# Patient Record
Sex: FEMALE | Race: WHITE | NOT HISPANIC OR LATINO | Employment: FULL TIME | URBAN - METROPOLITAN AREA
[De-identification: names, ages, dates, MRNs, and addresses within clinical notes are randomized per-mention and may not be internally consistent; named-entity substitution may affect disease eponyms.]

---

## 2017-11-05 ENCOUNTER — HOSPITAL ENCOUNTER (OUTPATIENT)
Dept: URGENT CARE | Facility: CLINIC | Age: 52
Discharge: HOME | End: 2017-11-05
Attending: FAMILY MEDICINE | Admitting: FAMILY MEDICINE

## 2017-11-09 ENCOUNTER — HOSPITAL ENCOUNTER (OUTPATIENT)
Dept: ORTHOPEDIC SURGERY | Facility: CLINIC | Age: 52
Discharge: HOME | End: 2017-11-09
Attending: PHYSICIAN ASSISTANT | Admitting: PHYSICIAN ASSISTANT

## 2017-11-28 ENCOUNTER — HOSPITAL ENCOUNTER (OUTPATIENT)
Dept: GENERAL RADIOLOGY | Facility: HOSPITAL | Age: 52
Discharge: HOME | End: 2017-11-28
Attending: PHYSICIAN ASSISTANT | Admitting: PHYSICIAN ASSISTANT

## 2018-04-12 ENCOUNTER — ON CAMPUS - OUTPATIENT (AMBULATORY)
Dept: URBAN - METROPOLITAN AREA HOSPITAL 2 | Facility: HOSPITAL | Age: 53
End: 2018-04-12
Payer: COMMERCIAL

## 2018-04-12 VITALS
SYSTOLIC BLOOD PRESSURE: 103 MMHG | HEART RATE: 64 BPM | RESPIRATION RATE: 18 BRPM | OXYGEN SATURATION: 100 % | DIASTOLIC BLOOD PRESSURE: 70 MMHG

## 2018-04-12 VITALS
SYSTOLIC BLOOD PRESSURE: 142 MMHG | OXYGEN SATURATION: 99 % | RESPIRATION RATE: 19 BRPM | DIASTOLIC BLOOD PRESSURE: 99 MMHG | HEART RATE: 64 BPM

## 2018-04-12 VITALS
OXYGEN SATURATION: 95 % | RESPIRATION RATE: 16 BRPM | SYSTOLIC BLOOD PRESSURE: 101 MMHG | DIASTOLIC BLOOD PRESSURE: 64 MMHG | HEART RATE: 73 BPM

## 2018-04-12 VITALS
OXYGEN SATURATION: 100 % | HEART RATE: 67 BPM | DIASTOLIC BLOOD PRESSURE: 77 MMHG | SYSTOLIC BLOOD PRESSURE: 132 MMHG | RESPIRATION RATE: 18 BRPM

## 2018-04-12 VITALS
SYSTOLIC BLOOD PRESSURE: 106 MMHG | HEART RATE: 68 BPM | DIASTOLIC BLOOD PRESSURE: 68 MMHG | RESPIRATION RATE: 16 BRPM | OXYGEN SATURATION: 97 %

## 2018-04-12 VITALS
OXYGEN SATURATION: 99 % | HEART RATE: 60 BPM | DIASTOLIC BLOOD PRESSURE: 60 MMHG | SYSTOLIC BLOOD PRESSURE: 99 MMHG | RESPIRATION RATE: 18 BRPM

## 2018-04-12 VITALS
SYSTOLIC BLOOD PRESSURE: 107 MMHG | OXYGEN SATURATION: 98 % | HEART RATE: 70 BPM | RESPIRATION RATE: 18 BRPM | DIASTOLIC BLOOD PRESSURE: 66 MMHG

## 2018-04-12 DIAGNOSIS — K64.8 OTHER HEMORRHOIDS: ICD-10-CM

## 2018-04-12 DIAGNOSIS — Z86.010 PERSONAL HISTORY OF COLONIC POLYPS: ICD-10-CM

## 2018-04-12 DIAGNOSIS — K57.30 DIVERTICULOSIS OF LARGE INTESTINE WITHOUT PERFORATION OR ABS: ICD-10-CM

## 2018-04-12 PROBLEM — Z12.11 SCREENING FOR MALIGNANT NEOPLASMS OF COLON: Status: ACTIVE | Noted: 2018-04-12

## 2018-04-12 PROCEDURE — 45378 DIAGNOSTIC COLONOSCOPY: CPT | Mod: 33 | Performed by: INTERNAL MEDICINE

## 2018-04-12 RX ADMIN — PROPOFOL: 10 INJECTION, EMULSION INTRAVENOUS at 09:42

## 2018-11-19 ENCOUNTER — APPOINTMENT (OUTPATIENT)
Dept: URBAN - METROPOLITAN AREA CLINIC 282 | Age: 53
Setting detail: DERMATOLOGY
End: 2018-11-26

## 2018-11-19 DIAGNOSIS — B36.0 PITYRIASIS VERSICOLOR: ICD-10-CM

## 2018-11-19 DIAGNOSIS — L82.0 INFLAMED SEBORRHEIC KERATOSIS: ICD-10-CM

## 2018-11-19 DIAGNOSIS — L82.1 OTHER SEBORRHEIC KERATOSIS: ICD-10-CM

## 2018-11-19 DIAGNOSIS — D22 MELANOCYTIC NEVI: ICD-10-CM

## 2018-11-19 DIAGNOSIS — Z12.83 ENCOUNTER FOR SCREENING FOR MALIGNANT NEOPLASM OF SKIN: ICD-10-CM

## 2018-11-19 DIAGNOSIS — D18.0 HEMANGIOMA: ICD-10-CM

## 2018-11-19 DIAGNOSIS — Z71.89 OTHER SPECIFIED COUNSELING: ICD-10-CM

## 2018-11-19 DIAGNOSIS — L21.8 OTHER SEBORRHEIC DERMATITIS: ICD-10-CM

## 2018-11-19 PROBLEM — D23.71 OTHER BENIGN NEOPLASM OF SKIN OF RIGHT LOWER LIMB, INCLUDING HIP: Status: ACTIVE | Noted: 2018-11-19

## 2018-11-19 PROBLEM — D22.71 MELANOCYTIC NEVI OF RIGHT LOWER LIMB, INCLUDING HIP: Status: ACTIVE | Noted: 2018-11-19

## 2018-11-19 PROBLEM — D18.01 HEMANGIOMA OF SKIN AND SUBCUTANEOUS TISSUE: Status: ACTIVE | Noted: 2018-11-19

## 2018-11-19 PROBLEM — D48.5 NEOPLASM OF UNCERTAIN BEHAVIOR OF SKIN: Status: ACTIVE | Noted: 2018-11-19

## 2018-11-19 PROCEDURE — OTHER REASSURANCE: OTHER

## 2018-11-19 PROCEDURE — OTHER OBSERVATION: OTHER

## 2018-11-19 PROCEDURE — OTHER COUNSELING: OTHER

## 2018-11-19 PROCEDURE — OTHER PRESCRIPTION: OTHER

## 2018-11-19 PROCEDURE — 17110 DESTRUCT B9 LESION 1-14: CPT

## 2018-11-19 PROCEDURE — 11100: CPT | Mod: 59

## 2018-11-19 PROCEDURE — 99203 OFFICE O/P NEW LOW 30 MIN: CPT | Mod: 25

## 2018-11-19 PROCEDURE — OTHER OBSERVATION AND MEASURE: OTHER

## 2018-11-19 PROCEDURE — OTHER BIOPSY BY SHAVE METHOD: OTHER

## 2018-11-19 PROCEDURE — OTHER LIQUID NITROGEN: OTHER

## 2018-11-19 RX ORDER — KETOCONAZOLE 20.5 MG/ML
SHAMPOO, SUSPENSION TOPICAL
Qty: 1 | Refills: 2 | Status: ERX

## 2018-11-19 ASSESSMENT — LOCATION DETAILED DESCRIPTION DERM
LOCATION DETAILED: LEFT INFERIOR UPPER BACK
LOCATION DETAILED: RIGHT SUPERIOR LATERAL MIDBACK
LOCATION DETAILED: RIGHT MEDIAL UPPER BACK
LOCATION DETAILED: LEFT MID-UPPER BACK
LOCATION DETAILED: NASAL SUPRATIP
LOCATION DETAILED: RIGHT SUPERIOR PARIETAL SCALP
LOCATION DETAILED: RIGHT SUPERIOR UPPER BACK
LOCATION DETAILED: EPIGASTRIC SKIN
LOCATION DETAILED: RIGHT ANTERIOR PROXIMAL THIGH
LOCATION DETAILED: INFERIOR THORACIC SPINE
LOCATION DETAILED: SUPERIOR THORACIC SPINE

## 2018-11-19 ASSESSMENT — LOCATION ZONE DERM
LOCATION ZONE: NOSE
LOCATION ZONE: LEG
LOCATION ZONE: SCALP
LOCATION ZONE: TRUNK

## 2018-11-19 ASSESSMENT — LOCATION SIMPLE DESCRIPTION DERM
LOCATION SIMPLE: LEFT UPPER BACK
LOCATION SIMPLE: NOSE
LOCATION SIMPLE: ABDOMEN
LOCATION SIMPLE: RIGHT LOWER BACK
LOCATION SIMPLE: SCALP
LOCATION SIMPLE: UPPER BACK
LOCATION SIMPLE: RIGHT THIGH
LOCATION SIMPLE: RIGHT UPPER BACK

## 2018-11-19 NOTE — PROCEDURE: LIQUID NITROGEN
Render Post-Care Instructions In Note?: no
Post-Care Instructions: I reviewed with the patient in detail post-care instructions. Patient is to wear sunprotection, and avoid picking at any of the treated lesions. Pt may apply Vaseline to crusted or scabbing areas.
Medical Necessity Clause: This procedure was medically necessary because the lesions that were treated were: getting pressed under pt's glasses
Detail Level: Detailed
Number Of Freeze-Thaw Cycles: 2 freeze-thaw cycles
Duration Of Freeze Thaw-Cycle (Seconds): 10-15
Medical Necessity Information: It is in your best interest to select a reason for this procedure from the list below. All of these items fulfill various CMS LCD requirements except the new and changing color options.
Consent: The patient's consent was obtained including but not limited to risks of crusting, scabbing, blistering, scarring, darker or lighter pigmentary change, recurrence, incomplete removal and infection.

## 2018-11-19 NOTE — PROCEDURE: BIOPSY BY SHAVE METHOD
Bill 32330 For Specimen Handling/Conveyance To Laboratory?: no
Biopsy Type: H and E
Notification Instructions: Patient will be notified of biopsy results. However, patient instructed to call the office if not contacted within 2 weeks.
Post-Care Instructions: I reviewed with the patient in detail post-care instructions. Patient is to keep the biopsy site dry overnight, and then apply bacitracin twice daily until healed. Patient may apply hydrogen peroxide soaks to remove any crusting.
Cryotherapy Text: The wound bed was treated with cryotherapy after the biopsy was performed.
Anesthesia Volume In Cc (Will Not Render If 0): 0.2
Hemostasis: Drysol
Silver Nitrate Text: The wound bed was treated with silver nitrate after the biopsy was performed.
Curettage Text: The wound bed was treated with curettage after the biopsy was performed.
Electrodesiccation Text: The wound bed was treated with electrodesiccation after the biopsy was performed
Was A Bandage Applied: Yes
X Size Of Lesion In Cm: 0
Dressing: bandage
Detail Level: Detailed
Wound Care: Vaseline
Electrodesiccation And Curettage Text: The wound bed was treated with electrodesiccation and curettage after the biopsy was performed.
Size Of Lesion In Cm: 0.5
Depth Of Biopsy: dermis
Type Of Destruction Used: Curettage
Billing Type: Third-Party Bill
Biopsy Method: Acu-Razor
Consent: Written consent was obtained and risks were reviewed including but not limited to scarring, infection, bleeding, scabbing, incomplete removal, nerve damage and allergy to anesthesia.
Anesthesia Type: 1% lidocaine with 1:100,000 epinephrine

## 2020-07-13 ENCOUNTER — APPOINTMENT (OUTPATIENT)
Dept: URBAN - METROPOLITAN AREA CLINIC 282 | Age: 55
Setting detail: DERMATOLOGY
End: 2020-07-14

## 2020-07-13 DIAGNOSIS — L21.8 OTHER SEBORRHEIC DERMATITIS: ICD-10-CM

## 2020-07-13 DIAGNOSIS — Z12.83 ENCOUNTER FOR SCREENING FOR MALIGNANT NEOPLASM OF SKIN: ICD-10-CM

## 2020-07-13 DIAGNOSIS — L82.0 INFLAMED SEBORRHEIC KERATOSIS: ICD-10-CM

## 2020-07-13 DIAGNOSIS — Z71.89 OTHER SPECIFIED COUNSELING: ICD-10-CM

## 2020-07-13 DIAGNOSIS — D22 MELANOCYTIC NEVI: ICD-10-CM

## 2020-07-13 PROBLEM — D23.71 OTHER BENIGN NEOPLASM OF SKIN OF RIGHT LOWER LIMB, INCLUDING HIP: Status: ACTIVE | Noted: 2020-07-13

## 2020-07-13 PROBLEM — D22.5 MELANOCYTIC NEVI OF TRUNK: Status: ACTIVE | Noted: 2020-07-13

## 2020-07-13 PROCEDURE — 17110 DESTRUCT B9 LESION 1-14: CPT

## 2020-07-13 PROCEDURE — OTHER COUNSELING: OTHER

## 2020-07-13 PROCEDURE — OTHER OBSERVATION AND MEASURE: OTHER

## 2020-07-13 PROCEDURE — OTHER OBSERVATION: OTHER

## 2020-07-13 PROCEDURE — OTHER LIQUID NITROGEN: OTHER

## 2020-07-13 PROCEDURE — OTHER REASSURANCE: OTHER

## 2020-07-13 PROCEDURE — 99214 OFFICE O/P EST MOD 30 MIN: CPT | Mod: 25

## 2020-07-13 PROCEDURE — OTHER PRESCRIPTION: OTHER

## 2020-07-13 RX ORDER — FLUOCINONIDE 0.5 MG/ML
SOLUTION TOPICAL
Qty: 1 | Refills: 2 | Status: ERX

## 2020-07-13 ASSESSMENT — LOCATION DETAILED DESCRIPTION DERM
LOCATION DETAILED: SUPERIOR THORACIC SPINE
LOCATION DETAILED: LEFT SUPERIOR LATERAL MIDBACK
LOCATION DETAILED: RIGHT SUPERIOR UPPER BACK
LOCATION DETAILED: LEFT INFERIOR LATERAL MALAR CHEEK
LOCATION DETAILED: NASAL DORSUM

## 2020-07-13 ASSESSMENT — LOCATION SIMPLE DESCRIPTION DERM
LOCATION SIMPLE: RIGHT UPPER BACK
LOCATION SIMPLE: UPPER BACK
LOCATION SIMPLE: LEFT CHEEK
LOCATION SIMPLE: LEFT LOWER BACK
LOCATION SIMPLE: NOSE

## 2020-07-13 ASSESSMENT — LOCATION ZONE DERM
LOCATION ZONE: FACE
LOCATION ZONE: TRUNK
LOCATION ZONE: NOSE

## 2020-07-13 NOTE — PROCEDURE: LIQUID NITROGEN
Duration Of Freeze Thaw-Cycle (Seconds): 10-15
Add 52 Modifier (Optional): no
Detail Level: Detailed
Medical Necessity Information: It is in your best interest to select a reason for this procedure from the list below. All of these items fulfill various CMS LCD requirements except the new and changing color options.
Post-Care Instructions: I reviewed with the patient in detail post-care instructions. Patient is to wear sunprotection, and avoid picking at any of the treated lesions. Pt may apply Vaseline to crusted or scabbing areas.
Medical Necessity Clause: This procedure was medically necessary because the lesions that were treated were:
Consent: The patient's consent was obtained including but not limited to risks of crusting, scabbing, blistering, scarring, darker or lighter pigmentary change, recurrence, incomplete removal and infection.
Number Of Freeze-Thaw Cycles: 2 freeze-thaw cycles

## 2021-10-11 ENCOUNTER — APPOINTMENT (OUTPATIENT)
Dept: URBAN - METROPOLITAN AREA CLINIC 282 | Age: 56
Setting detail: DERMATOLOGY
End: 2021-10-15

## 2021-10-11 DIAGNOSIS — L57.8 OTHER SKIN CHANGES DUE TO CHRONIC EXPOSURE TO NONIONIZING RADIATION: ICD-10-CM

## 2021-10-11 DIAGNOSIS — Z71.89 OTHER SPECIFIED COUNSELING: ICD-10-CM

## 2021-10-11 DIAGNOSIS — L82.1 OTHER SEBORRHEIC KERATOSIS: ICD-10-CM

## 2021-10-11 DIAGNOSIS — D22 MELANOCYTIC NEVI: ICD-10-CM

## 2021-10-11 DIAGNOSIS — L72.0 EPIDERMAL CYST: ICD-10-CM

## 2021-10-11 DIAGNOSIS — D18.0 HEMANGIOMA: ICD-10-CM

## 2021-10-11 DIAGNOSIS — L21.8 OTHER SEBORRHEIC DERMATITIS: ICD-10-CM

## 2021-10-11 DIAGNOSIS — L70.0 ACNE VULGARIS: ICD-10-CM

## 2021-10-11 PROBLEM — D22.5 MELANOCYTIC NEVI OF TRUNK: Status: ACTIVE | Noted: 2021-10-11

## 2021-10-11 PROBLEM — D23.71 OTHER BENIGN NEOPLASM OF SKIN OF RIGHT LOWER LIMB, INCLUDING HIP: Status: ACTIVE | Noted: 2021-10-11

## 2021-10-11 PROBLEM — D48.5 NEOPLASM OF UNCERTAIN BEHAVIOR OF SKIN: Status: ACTIVE | Noted: 2021-10-11

## 2021-10-11 PROBLEM — D18.01 HEMANGIOMA OF SKIN AND SUBCUTANEOUS TISSUE: Status: ACTIVE | Noted: 2021-10-11

## 2021-10-11 PROCEDURE — OTHER OBSERVATION: OTHER

## 2021-10-11 PROCEDURE — OTHER COUNSELING: OTHER

## 2021-10-11 PROCEDURE — 99214 OFFICE O/P EST MOD 30 MIN: CPT | Mod: 25

## 2021-10-11 PROCEDURE — OTHER PRESCRIPTION: OTHER

## 2021-10-11 PROCEDURE — OTHER SUNSCREEN RECOMMENDATIONS: OTHER

## 2021-10-11 PROCEDURE — OTHER PRESCRIPTION MEDICATION MANAGEMENT: OTHER

## 2021-10-11 PROCEDURE — OTHER MIPS QUALITY: OTHER

## 2021-10-11 PROCEDURE — OTHER BIOPSY BY SHAVE METHOD: OTHER

## 2021-10-11 PROCEDURE — OTHER OBSERVATION AND MEASURE: OTHER

## 2021-10-11 PROCEDURE — OTHER REASSURANCE: OTHER

## 2021-10-11 PROCEDURE — 11102 TANGNTL BX SKIN SINGLE LES: CPT

## 2021-10-11 RX ORDER — TRETIONIN 0.25 MG/G
CREAM TOPICAL
Qty: 45 | Refills: 1 | Status: ERX | COMMUNITY
Start: 2021-10-11

## 2021-10-11 RX ORDER — FLUOCINONIDE 0.5 MG/ML
SOLUTION TOPICAL
Qty: 60 | Refills: 1 | Status: ERX | COMMUNITY
Start: 2021-10-11

## 2021-10-11 RX ORDER — KETOCONAZOLE 20 MG/ML
SHAMPOO, SUSPENSION TOPICAL
Qty: 120 | Refills: 2 | Status: ERX | COMMUNITY
Start: 2021-10-11

## 2021-10-11 ASSESSMENT — LOCATION SIMPLE DESCRIPTION DERM
LOCATION SIMPLE: LEFT CHEEK
LOCATION SIMPLE: RIGHT CALF
LOCATION SIMPLE: RIGHT UPPER BACK
LOCATION SIMPLE: LEFT CHEEK
LOCATION SIMPLE: LEFT HAND
LOCATION SIMPLE: RIGHT THIGH
LOCATION SIMPLE: RIGHT FOREARM
LOCATION SIMPLE: RIGHT PRETIBIAL REGION
LOCATION SIMPLE: ABDOMEN
LOCATION SIMPLE: CHEST
LOCATION SIMPLE: LEFT UPPER BACK
LOCATION SIMPLE: RIGHT POSTERIOR THIGH
LOCATION SIMPLE: RIGHT SCALP
LOCATION SIMPLE: RIGHT POSTERIOR UPPER ARM
LOCATION SIMPLE: SCALP
LOCATION SIMPLE: RIGHT CHEEK
LOCATION SIMPLE: RIGHT HAND
LOCATION SIMPLE: POSTERIOR NECK
LOCATION SIMPLE: LEFT PRETIBIAL REGION
LOCATION SIMPLE: RIGHT CHEEK
LOCATION SIMPLE: LEFT CALF
LOCATION SIMPLE: LEFT FOREARM

## 2021-10-11 ASSESSMENT — LOCATION DETAILED DESCRIPTION DERM
LOCATION DETAILED: RIGHT ANTERIOR DISTAL THIGH
LOCATION DETAILED: RIGHT RADIAL DORSAL HAND
LOCATION DETAILED: RIGHT DISTAL CALF
LOCATION DETAILED: RIGHT DISTAL POSTERIOR UPPER ARM
LOCATION DETAILED: RIGHT SUPERIOR UPPER BACK
LOCATION DETAILED: LEFT MID-UPPER BACK
LOCATION DETAILED: RIGHT PROXIMAL PRETIBIAL REGION
LOCATION DETAILED: RIGHT DISTAL PRETIBIAL REGION
LOCATION DETAILED: RIGHT PROXIMAL DORSAL FOREARM
LOCATION DETAILED: RIGHT SUPERIOR PARIETAL SCALP
LOCATION DETAILED: LEFT CENTRAL BUCCAL CHEEK
LOCATION DETAILED: RIGHT ANTERIOR PROXIMAL THIGH
LOCATION DETAILED: UPPER STERNUM
LOCATION DETAILED: RIGHT MEDIAL FRONTAL SCALP
LOCATION DETAILED: LEFT CENTRAL MALAR CHEEK
LOCATION DETAILED: RIGHT SUPERIOR MEDIAL UPPER BACK
LOCATION DETAILED: LEFT ULNAR DORSAL HAND
LOCATION DETAILED: RIGHT INFERIOR CENTRAL MALAR CHEEK
LOCATION DETAILED: RIGHT DISTAL POSTERIOR THIGH
LOCATION DETAILED: LEFT PROXIMAL PRETIBIAL REGION
LOCATION DETAILED: EPIGASTRIC SKIN
LOCATION DETAILED: LEFT PROXIMAL DORSAL FOREARM
LOCATION DETAILED: LEFT DISTAL CALF
LOCATION DETAILED: RIGHT CENTRAL MALAR CHEEK
LOCATION DETAILED: MID POSTERIOR NECK
LOCATION DETAILED: LEFT INFERIOR CENTRAL MALAR CHEEK
LOCATION DETAILED: RIGHT LATERAL ABDOMEN

## 2021-10-11 ASSESSMENT — LOCATION ZONE DERM
LOCATION ZONE: FACE
LOCATION ZONE: ARM
LOCATION ZONE: LEG
LOCATION ZONE: HAND
LOCATION ZONE: TRUNK
LOCATION ZONE: SCALP
LOCATION ZONE: NECK
LOCATION ZONE: FACE

## 2021-10-11 NOTE — PROCEDURE: MIPS QUALITY
Quality 130: Documentation Of Current Medications In The Medical Record: Current Medications Documented
Quality 431: Preventive Care And Screening: Unhealthy Alcohol Use - Screening: Patient not identified as an unhealthy alcohol user when screened for unhealthy alcohol use using a systematic screening method
Quality 111:Pneumonia Vaccination Status For Older Adults: Pneumococcal Vaccination Previously Received
Detail Level: Detailed
Quality 110: Preventive Care And Screening: Influenza Immunization: Influenza Immunization previously received during influenza season

## 2021-10-11 NOTE — PROCEDURE: COUNSELING
Minocycline Counseling: Patient advised regarding possible photosensitivity and discoloration of the teeth, skin, lips, tongue and gums.  Patient instructed to avoid sunlight, if possible.  When exposed to sunlight, patients should wear protective clothing, sunglasses, and sunscreen.  The patient was instructed to call the office immediately if the following severe adverse effects occur:  hearing changes, easy bruising/bleeding, severe headache, or vision changes.  The patient verbalized understanding of the proper use and possible adverse effects of minocycline.  All of the patient's questions and concerns were addressed.
High Dose Vitamin A Counseling: Side effects reviewed, pt to contact office should one occur.
Azithromycin Counseling:  I discussed with the patient the risks of azithromycin including but not limited to GI upset, allergic reaction, drug rash, diarrhea, and yeast infections.
Detail Level: Detailed
Topical Sulfur Applications Counseling: Topical Sulfur Counseling: Patient counseled that this medication may cause skin irritation or allergic reactions.  In the event of skin irritation, the patient was advised to reduce the amount of the drug applied or use it less frequently.   The patient verbalized understanding of the proper use and possible adverse effects of topical sulfur application.  All of the patient's questions and concerns were addressed.
Erythromycin Counseling:  I discussed with the patient the risks of erythromycin including but not limited to GI upset, allergic reaction, drug rash, diarrhea, increase in liver enzymes, and yeast infections.
Tetracycline Counseling: Patient counseled regarding possible photosensitivity and increased risk for sunburn.  Patient instructed to avoid sunlight, if possible.  When exposed to sunlight, patients should wear protective clothing, sunglasses, and sunscreen.  The patient was instructed to call the office immediately if the following severe adverse effects occur:  hearing changes, easy bruising/bleeding, severe headache, or vision changes.  The patient verbalized understanding of the proper use and possible adverse effects of tetracycline.  All of the patient's questions and concerns were addressed. Patient understands to avoid pregnancy while on therapy due to potential birth defects.
Dapsone Counseling: I discussed with the patient the risks of dapsone including but not limited to hemolytic anemia, agranulocytosis, rashes, methemoglobinemia, kidney failure, peripheral neuropathy, headaches, GI upset, and liver toxicity.  Patients who start dapsone require monitoring including baseline LFTs and weekly CBCs for the first month, then every month thereafter.  The patient verbalized understanding of the proper use and possible adverse effects of dapsone.  All of the patient's questions and concerns were addressed.
Tazorac Pregnancy And Lactation Text: This medication is not safe during pregnancy. It is unknown if this medication is excreted in breast milk.
Birth Control Pills Pregnancy And Lactation Text: This medication should be avoided if pregnant and for the first 30 days post-partum.
Isotretinoin Pregnancy And Lactation Text: This medication is Pregnancy Category X and is considered extremely dangerous during pregnancy. It is unknown if it is excreted in breast milk.
Topical Sulfur Applications Pregnancy And Lactation Text: This medication is Pregnancy Category C and has an unknown safety profile during pregnancy. It is unknown if this topical medication is excreted in breast milk.
Erythromycin Pregnancy And Lactation Text: This medication is Pregnancy Category B and is considered safe during pregnancy. It is also excreted in breast milk.
Topical Clindamycin Pregnancy And Lactation Text: This medication is Pregnancy Category B and is considered safe during pregnancy. It is unknown if it is excreted in breast milk.
Include Pregnancy/Lactation Warning?: No
Spironolactone Counseling: Patient advised regarding risks of diarrhea, abdominal pain, hyperkalemia, birth defects (for female patients), liver toxicity and renal toxicity. The patient may need blood work to monitor liver and kidney function and potassium levels while on therapy. The patient verbalized understanding of the proper use and possible adverse effects of spironolactone.  All of the patient's questions and concerns were addressed.
Azithromycin Pregnancy And Lactation Text: This medication is considered safe during pregnancy and is also secreted in breast milk.
Sarecycline Counseling: Patient advised regarding possible photosensitivity and discoloration of the teeth, skin, lips, tongue and gums.  Patient instructed to avoid sunlight, if possible.  When exposed to sunlight, patients should wear protective clothing, sunglasses, and sunscreen.  The patient was instructed to call the office immediately if the following severe adverse effects occur:  hearing changes, easy bruising/bleeding, severe headache, or vision changes.  The patient verbalized understanding of the proper use and possible adverse effects of sarecycline.  All of the patient's questions and concerns were addressed.
Detail Level: Zone
Topical Retinoid counseling:  Patient advised to apply a pea-sized amount only at bedtime and wait 30 minutes after washing their face before applying.  If too drying, patient may add a non-comedogenic moisturizer. The patient verbalized understanding of the proper use and possible adverse effects of retinoids.  All of the patient's questions and concerns were addressed.
Doxycycline Pregnancy And Lactation Text: This medication is Pregnancy Category D and not consider safe during pregnancy. It is also excreted in breast milk but is considered safe for shorter treatment courses.
High Dose Vitamin A Pregnancy And Lactation Text: High dose vitamin A therapy is contraindicated during pregnancy and breast feeding.
Benzoyl Peroxide Counseling: Patient counseled that medicine may cause skin irritation and bleach clothing.  In the event of skin irritation, the patient was advised to reduce the amount of the drug applied or use it less frequently.   The patient verbalized understanding of the proper use and possible adverse effects of benzoyl peroxide.  All of the patient's questions and concerns were addressed.
Benzoyl Peroxide Pregnancy And Lactation Text: This medication is Pregnancy Category C. It is unknown if benzoyl peroxide is excreted in breast milk.
Detail Level: Simple
Spironolactone Pregnancy And Lactation Text: This medication can cause feminization of the male fetus and should be avoided during pregnancy. The active metabolite is also found in breast milk.
Tazorac Counseling:  Patient advised that medication is irritating and drying.  Patient may need to apply sparingly and wash off after an hour before eventually leaving it on overnight.  The patient verbalized understanding of the proper use and possible adverse effects of tazorac.  All of the patient's questions and concerns were addressed.
Bactrim Counseling:  I discussed with the patient the risks of sulfa antibiotics including but not limited to GI upset, allergic reaction, drug rash, diarrhea, dizziness, photosensitivity, and yeast infections.  Rarely, more serious reactions can occur including but not limited to aplastic anemia, agranulocytosis, methemoglobinemia, blood dyscrasias, liver or kidney failure, lung infiltrates or desquamative/blistering drug rashes.
Bactrim Pregnancy And Lactation Text: This medication is Pregnancy Category D and is known to cause fetal risk.  It is also excreted in breast milk.
Minocycline Pregnancy And Lactation Text: This medication is Pregnancy Category D and not consider safe during pregnancy. It is also excreted in breast milk.
Birth Control Pills Counseling: Birth Control Pill Counseling: I discussed with the patient the potential side effects of OCPs including but not limited to increased risk of stroke, heart attack, thrombophlebitis, deep venous thrombosis, hepatic adenomas, breast changes, GI upset, headaches, and depression.  The patient verbalized understanding of the proper use and possible adverse effects of OCPs. All of the patient's questions and concerns were addressed.
Dapsone Pregnancy And Lactation Text: This medication is Pregnancy Category C and is not considered safe during pregnancy or breast feeding.
Topical Clindamycin Counseling: Patient counseled that this medication may cause skin irritation or allergic reactions.  In the event of skin irritation, the patient was advised to reduce the amount of the drug applied or use it less frequently.   The patient verbalized understanding of the proper use and possible adverse effects of clindamycin.  All of the patient's questions and concerns were addressed.
Isotretinoin Counseling: Patient should get monthly blood tests, not donate blood, not drive at night if vision affected, not share medication, and not undergo elective surgery for 6 months after tx completed. Side effects reviewed, pt to contact office should one occur.
Doxycycline Counseling:  Patient counseled regarding possible photosensitivity and increased risk for sunburn.  Patient instructed to avoid sunlight, if possible.  When exposed to sunlight, patients should wear protective clothing, sunglasses, and sunscreen.  The patient was instructed to call the office immediately if the following severe adverse effects occur:  hearing changes, easy bruising/bleeding, severe headache, or vision changes.  The patient verbalized understanding of the proper use and possible adverse effects of doxycycline.  All of the patient's questions and concerns were addressed.
Topical Retinoid Pregnancy And Lactation Text: This medication is Pregnancy Category C. It is unknown if this medication is excreted in breast milk.

## 2021-10-11 NOTE — PROCEDURE: PRESCRIPTION MEDICATION MANAGEMENT
Render In Strict Bullet Format?: No
Detail Level: Zone
Initiate Treatment: tretinoin 0.025 % topical cream Apply to face once daily at bedtime, titrate up to every night from twice per week to as tolerating redness and dryness.
Initiate Treatment: ketoconazole 2 % shampoo Apply and wash face, neck, chest area in the shower, lather, let sit for 5 mins then rinse, once daily\\n\\nfluocinonide 0.05 % topical Apply to scalp once daily for 15 days a month, at most. Repeat cycle as needed.  Use on itchiest and flakiest days. Do not apply to face

## 2021-10-11 NOTE — PROCEDURE: BIOPSY BY SHAVE METHOD
Hide Second Anesthesia?: No
Billing Type: Third-Party Bill
X Size Of Lesion In Cm: 0
Information: Selecting Yes will display possible errors in your note based on the variables you have selected. This validation is only offered as a suggestion for you. PLEASE NOTE THAT THE VALIDATION TEXT WILL BE REMOVED WHEN YOU FINALIZE YOUR NOTE. IF YOU WANT TO FAX A PRELIMINARY NOTE YOU WILL NEED TO TOGGLE THIS TO 'NO' IF YOU DO NOT WANT IT IN YOUR FAXED NOTE.
Dressing: bandage
Silver Nitrate Text: The wound bed was treated with silver nitrate after the biopsy was performed.
Curettage Text: The wound bed was treated with curettage after the biopsy was performed.
Wound Care: Petrolatum
Detail Level: Detailed
Was A Bandage Applied: Yes
Electrodesiccation Text: The wound bed was treated with electrodesiccation after the biopsy was performed.
Anesthesia Volume In Cc (Will Not Render If 0): 0.5
Cryotherapy Text: The wound bed was treated with cryotherapy after the biopsy was performed.
Post-Care Instructions: I reviewed with the patient in detail post-care instructions. Patient is to keep the biopsy site dry overnight, and then apply bacitracin twice daily until healed. Patient may apply hydrogen peroxide soaks to remove any crusting.
Biopsy Type: H and E
Depth Of Biopsy: dermis
Consent: Written consent was obtained and risks were reviewed including but not limited to scarring, infection, bleeding, scabbing, incomplete removal, nerve damage and allergy to anesthesia.
Anesthesia Type: 1% lidocaine with epinephrine
Electrodesiccation And Curettage Text: The wound bed was treated with electrodesiccation and curettage after the biopsy was performed.
Hemostasis: Drysol
Notification Instructions: Patient will be notified of biopsy results. However, patient instructed to call the office if not contacted within 2 weeks.
Type Of Destruction Used: Curettage
Biopsy Method: Dermablade

## 2022-01-11 ENCOUNTER — OFFICE VISIT (OUTPATIENT)
Dept: FAMILY MEDICINE CLINIC | Facility: CLINIC | Age: 57
End: 2022-01-11

## 2022-01-11 VITALS
BODY MASS INDEX: 31.22 KG/M2 | HEIGHT: 68 IN | DIASTOLIC BLOOD PRESSURE: 83 MMHG | HEART RATE: 68 BPM | WEIGHT: 206 LBS | TEMPERATURE: 97.8 F | OXYGEN SATURATION: 100 % | SYSTOLIC BLOOD PRESSURE: 123 MMHG

## 2022-01-11 DIAGNOSIS — M79.10 MUSCLE PAIN: Primary | ICD-10-CM

## 2022-01-11 DIAGNOSIS — E78.5 HYPERLIPIDEMIA, UNSPECIFIED HYPERLIPIDEMIA TYPE: ICD-10-CM

## 2022-01-11 DIAGNOSIS — F41.9 ANXIETY: ICD-10-CM

## 2022-01-11 DIAGNOSIS — I10 HYPERTENSION, UNSPECIFIED TYPE: ICD-10-CM

## 2022-01-11 DIAGNOSIS — Z00.00 PREVENTATIVE HEALTH CARE: ICD-10-CM

## 2022-01-11 PROBLEM — IMO0002 CLOSED FRACTURE OF MIDDLE OR PROXIMAL PHALANX OR PHALANGES OF HAND: Status: ACTIVE | Noted: 2017-11-05

## 2022-01-11 PROCEDURE — 99204 OFFICE O/P NEW MOD 45 MIN: CPT | Performed by: NURSE PRACTITIONER

## 2022-01-11 RX ORDER — CITALOPRAM 20 MG/1
20 TABLET ORAL DAILY
COMMUNITY
Start: 2022-01-03 | End: 2022-01-11

## 2022-01-11 RX ORDER — LORATADINE 10 MG/1
10 TABLET ORAL DAILY
COMMUNITY

## 2022-01-11 RX ORDER — ALPRAZOLAM 0.5 MG/1
TABLET ORAL
COMMUNITY
Start: 2021-09-29

## 2022-01-11 RX ORDER — HYDROCHLOROTHIAZIDE 12.5 MG/1
12.5 TABLET ORAL DAILY
COMMUNITY
Start: 2021-12-28 | End: 2022-03-06

## 2022-01-11 RX ORDER — DULOXETIN HYDROCHLORIDE 30 MG/1
30 CAPSULE, DELAYED RELEASE ORAL DAILY
Qty: 30 CAPSULE | Refills: 0 | Status: SHIPPED | OUTPATIENT
Start: 2022-01-11 | End: 2022-02-14

## 2022-01-11 RX ORDER — OMEPRAZOLE 20 MG/1
20 CAPSULE, DELAYED RELEASE ORAL DAILY
COMMUNITY

## 2022-01-11 RX ORDER — GABAPENTIN 100 MG/1
100 CAPSULE ORAL 3 TIMES DAILY
COMMUNITY
Start: 2021-12-13 | End: 2022-02-14

## 2022-01-11 NOTE — PATIENT INSTRUCTIONS
Stop celexa  Start cymbalta  Can take 4-6 wks to see results.  Work on diet and exercise  Check labs

## 2022-01-11 NOTE — PROGRESS NOTES
Subjective     Asya Lambert is a 56 y.o. female.     Asya Lambert is here today to establish care.  She is from this area  Previous PCP was Dr. Brown  Marital status-   Children- Yes  Works as a surgery schedule with [employer]  Exercise- not in the winter  Diet- Eating well-balanced meals and healthy snacks with no concerns  4 yrs ago she had a false positive for lupus.  She was followed by rheumatology.  Pt has a history of cervical fusion.    HTN/edema- pt is currently on Hctz 12.5mg daily. Denies CP, SOA, dizziness, HA. Helps with swelling in the feet.     Hyperlipidemia- patient was previously prescribed Crestor 10 mg daily. She states she came off of it due to muscle pain.     Anxiety- patient is currently on Celexa 20 mg daily. She was put on this for pain initially. She was on cymbalta, but wasn't sure if it helped much with the anxiety. She has xanax as needed. She rarely uses it.  She only takes 1/2 tab.     Neuropathy- she is currently on gabapentin 100mg TID. She has a history of cervical fusions. She has some restless leg.     Labs- due  Pap smear- UTD- Dr. Gutierrez- has mirena, she had it taken out and was still having menstrual cycles- she got it back in (aug 2018) due to cycle issues.   Mammogram- UTD  DEXA-  Colonoscopy- 4 yrs ago    Vaccines:  Flu- UTD  PNA- N/A  Shingles- will check with pharmacy  Tdap- 2017  Covid-19- had one dose    Dental exam-  Eye exam-       The following portions of the patient's history were reviewed and updated as appropriate: allergies, current medications, past family history, past medical history, past social history, past surgical history and problem list.    Review of Systems   Constitutional: Negative for appetite change, chills, fatigue and fever.   HENT: Negative for congestion, ear pain, hearing loss, postnasal drip, rhinorrhea, sinus pressure, sore throat, swollen glands and trouble swallowing.    Eyes: Negative for blurred vision, double vision, pain,  "discharge, itching and visual disturbance.   Respiratory: Negative for cough, chest tightness, shortness of breath and wheezing.    Cardiovascular: Negative for chest pain and palpitations.   Gastrointestinal: Negative for abdominal pain, blood in stool, constipation, diarrhea, nausea and vomiting.   Endocrine: Negative for polydipsia, polyphagia and polyuria.   Genitourinary: Negative for dysuria, flank pain, frequency and urgency.   Musculoskeletal: Positive for arthralgias, back pain and myalgias.   Skin: Negative for rash and skin lesions.   Neurological: Negative for dizziness, weakness, numbness and headache.   Psychiatric/Behavioral: Negative for self-injury, suicidal ideas, depressed mood and stress. The patient is nervous/anxious.        Objective     /83 (BP Location: Right arm, Patient Position: Sitting, Cuff Size: Adult)   Pulse 68   Temp 97.8 °F (36.6 °C) (Tympanic)   Ht 172.7 cm (68\")   Wt 93.4 kg (206 lb)   SpO2 100%   BMI 31.32 kg/m²     Current Outpatient Medications on File Prior to Visit   Medication Sig Dispense Refill   • ALPRAZolam (XANAX) 0.5 MG tablet TAKE 1/2 TO 1 TABLET BY MOUTH DAILY AS NEEDED FOR ANXIETY     • gabapentin (NEURONTIN) 100 MG capsule Take 100 mg by mouth 3 (Three) Times a Day.     • hydroCHLOROthiazide (HYDRODIURIL) 12.5 MG tablet Take 12.5 mg by mouth Daily.     • loratadine (CLARITIN) 10 MG tablet Take 10 mg by mouth Daily.     • omeprazole (priLOSEC) 20 MG capsule Take 20 mg by mouth Daily.     • [DISCONTINUED] citalopram (CeleXA) 20 MG tablet Take 20 mg by mouth Daily.     • levonorgestrel (MIRENA) 20 MCG/24HR IUD 1 each by Intrauterine route.     • [DISCONTINUED] ciprofloxacin (CIPRO) 250 MG tablet Take 1 tablet by mouth 2 (Two) Times a Day. 14 tablet 0   • [DISCONTINUED] DULoxetine (CYMBALTA) 30 MG capsule      • [DISCONTINUED] indomethacin SR (INDOCIN SR) 75 MG CR capsule TK ONE C PO  BID PRN     • [DISCONTINUED] phenazopyridine (PYRIDIUM) 100 MG tablet " Take 1 tablet by mouth 3 (Three) Times a Day. 6 tablet 0   • [DISCONTINUED] rosuvastatin (CRESTOR) 10 MG tablet        No current facility-administered medications on file prior to visit.        Physical Exam  Vitals reviewed.   Constitutional:       General: She is not in acute distress.     Appearance: Normal appearance. She is well-developed. She is not diaphoretic.   HENT:      Head: Normocephalic and atraumatic.      Right Ear: Tympanic membrane and ear canal normal.      Left Ear: Tympanic membrane and ear canal normal.   Eyes:      General:         Right eye: No discharge.         Left eye: No discharge.      Extraocular Movements: Extraocular movements intact.      Conjunctiva/sclera: Conjunctivae normal.   Cardiovascular:      Rate and Rhythm: Normal rate and regular rhythm.   Pulmonary:      Effort: Pulmonary effort is normal. No respiratory distress.      Breath sounds: Normal breath sounds. No wheezing or rales.   Abdominal:      General: Bowel sounds are normal.      Palpations: Abdomen is soft.   Musculoskeletal:         General: Normal range of motion.      Cervical back: Normal range of motion.   Skin:     General: Skin is warm and dry.   Neurological:      General: No focal deficit present.      Mental Status: She is alert and oriented to person, place, and time.   Psychiatric:         Mood and Affect: Mood normal.         Behavior: Behavior normal.         Thought Content: Thought content normal.         Judgment: Judgment normal.           Assessment/Plan     Diagnoses and all orders for this visit:    1. Muscle pain (Primary)  Comments:  chronic issue  stopped statin  will repeat autoimmune panel  start cymbalta  f/u 1 mo  Orders:  -     Rheumatoid factor; Future  -     C-reactive protein; Future  -     CARRINGTON; Future    2. Preventative health care  Comments:  work on diet and exercise  check labs  encouraged covid vaccine  sees gyn  Orders:  -     Comprehensive metabolic panel; Future  -     Lipid  panel; Future  -     Hepatitis C Antibody; Future    3. Anxiety  Comments:  deterioated  switch back to cymbalta- will also help with pain  f/u1 mo  Orders:  -     DULoxetine (Cymbalta) 30 MG capsule; Take 1 capsule by mouth Daily.  Dispense: 30 capsule; Refill: 0    4. Hyperlipidemia, unspecified hyperlipidemia type  Comments:  check labs  work on diet and exercise    5. Hypertension, unspecified type  Comments:  stable  cont Hctz

## 2022-01-12 ENCOUNTER — TELEPHONE (OUTPATIENT)
Dept: FAMILY MEDICINE CLINIC | Facility: CLINIC | Age: 57
End: 2022-01-12

## 2022-01-12 NOTE — TELEPHONE ENCOUNTER
Caller: Asya Lambert    Relationship: Self    Best call back number:[phone] (H)    What orders are you requesting (i.e. lab or imaging): LABS    In what timeframe would the patient need to come in: 01/14/22    Where will you receive your lab/imaging services: PATIENT WOULD LIKE ORDERS FAXED OVER TO [facility]   [phone]     Additional notes: PATIENT STATE SWILL BE HAVING LABS DONE ON Friday 01/14/22

## 2022-01-19 ENCOUNTER — TELEPHONE (OUTPATIENT)
Dept: FAMILY MEDICINE CLINIC | Facility: CLINIC | Age: 57
End: 2022-01-19

## 2022-01-19 NOTE — TELEPHONE ENCOUNTER
Total cholesterol is high at 319 (goal is under 200);  LDL is high at 219 (goal is under 100).  I don't see anything on her list for cholesterol; does she take anything for this?  If not I would recommend starting cholesterol lowering medication.  She also needs to work on healthy diet; limit carbs and sweets; increase plant-based foods; increase exercise; work on weight loss.     It shows the Rheumatoid factor and CARRINGTON are still pending.

## 2022-01-19 NOTE — TELEPHONE ENCOUNTER
Caller: Asya Lambert    Relationship: Self    Best call back number: [phone] (H)    What is the best time to reach you: ANY TIME     Who are you requesting to speak with (clinical staff, provider,  specific staff member): CLINICAL STAFF     What was the call regarding: PATIENT CALLED STATING ELAINA GILL ORDERED LABS FOR HER ON 01/11/22- SHE WENT AHEAD AND GOT THESE DRAWN THROUGH WORK OVER AT:    [facility] PHONE NUMBER, [facility] [phone]     PATIENT NEEDS US TO CALL THEM AND REQUEST THESE LAB ORDERS SHE JUST HAD DONE ON THE 01/17/22.    NO LONGER NEEDS THESE RAN AT OUR OFFICE SINCE SHE ALREADY DID THEM.    Do you require a callback: YES

## 2022-02-13 NOTE — PROGRESS NOTES
Subjective     Asya Lambert is a 56 y.o. female.     Patient is here today for 1 month follow-up on muscle pain and anxiety.  Last month she was started on Cymbalta 30 mg daily to hopefully help with both.  Patient reports that she is doing better on the medication.  She isnt having as many palpitations.   She would like to try a higher dose.  She has some stressors at home.   She does feel like her muscle pains are as bad.  She states that he back has been hurting from bathing her mother.  She has tried taking advil.   Pt's previous lipid panel showed total cholesterol of 319.  She restarted her crestor 20mg.  She states that since restarted muscle pain has worsened.   She is hoping to try something else.  She has been working on diet and exercise  She is down 5lbs.        The following portions of the patient's history were reviewed and updated as appropriate: allergies, current medications, past family history, past medical history, past social history, past surgical history and problem list.    Review of Systems   Constitutional: Negative for chills, fatigue and fever.   Respiratory: Negative for chest tightness and shortness of breath.    Cardiovascular: Negative for chest pain and palpitations.   Gastrointestinal: Negative for abdominal pain, nausea and vomiting.   Musculoskeletal: Positive for arthralgias and back pain.   Neurological: Negative for dizziness and headache.   Psychiatric/Behavioral: Positive for stress. Negative for self-injury, suicidal ideas and depressed mood. The patient is nervous/anxious.        Objective     /84   Pulse 76   Temp 97.8 °F (36.6 °C) (Tympanic)   Wt 91.2 kg (201 lb)   SpO2 99%   BMI 30.56 kg/m²     Current Outpatient Medications on File Prior to Visit   Medication Sig Dispense Refill   • Cholecalciferol 50 MCG (2000 UT) capsule      • ALPRAZolam (XANAX) 0.5 MG tablet TAKE 1/2 TO 1 TABLET BY MOUTH DAILY AS NEEDED FOR ANXIETY     • gabapentin (NEURONTIN) 100 MG  capsule Take 100 mg by mouth 3 (Three) Times a Day.     • hydroCHLOROthiazide (HYDRODIURIL) 12.5 MG tablet Take 12.5 mg by mouth Daily.     • levonorgestrel (MIRENA) 20 MCG/24HR IUD 1 each by Intrauterine route.     • loratadine (CLARITIN) 10 MG tablet Take 10 mg by mouth Daily.     • omeprazole (priLOSEC) 20 MG capsule Take 20 mg by mouth Daily.     • [DISCONTINUED] DULoxetine (Cymbalta) 30 MG capsule Take 1 capsule by mouth Daily. 30 capsule 0     No current facility-administered medications on file prior to visit.        Physical Exam  Constitutional:       Appearance: Normal appearance. She is not ill-appearing.   HENT:      Head: Normocephalic and atraumatic.   Cardiovascular:      Rate and Rhythm: Normal rate and regular rhythm.      Heart sounds: No murmur heard.      Pulmonary:      Effort: Pulmonary effort is normal. No respiratory distress.      Breath sounds: Normal breath sounds.   Musculoskeletal:         General: Normal range of motion.   Skin:     General: Skin is warm and dry.   Neurological:      General: No focal deficit present.      Mental Status: She is alert and oriented to person, place, and time.   Psychiatric:         Mood and Affect: Mood normal.         Behavior: Behavior normal.         Thought Content: Thought content normal.         Judgment: Judgment normal.           Assessment/Plan     Diagnoses and all orders for this visit:    1. Anxiety (Primary)  Comments:  improved  will try increasing cymbalta to 60mg daily  message me with an update  f/u 3 mo  Orders:  -     DULoxetine (Cymbalta) 60 MG capsule; Take 1 capsule by mouth Daily.  Dispense: 90 capsule; Refill: 1    2. Muscle pain  Comments:  some improvement  inc cymbalta  strained back recently   will give 4 days of prednisone  Orders:  -     DULoxetine (Cymbalta) 60 MG capsule; Take 1 capsule by mouth Daily.  Dispense: 90 capsule; Refill: 1  -     predniSONE (DELTASONE) 20 MG tablet; Take 2 tablets by mouth Daily for 4 days.   Dispense: 8 tablet; Refill: 0    3. Hyperlipidemia, unspecified hyperlipidemia type  Comments:  lipid panel elevated  muscle pain with statin  try zetia  work on diet and exercise  Orders:  -     ezetimibe (Zetia) 10 MG tablet; Take 1 tablet by mouth Daily.  Dispense: 90 tablet; Refill: 1

## 2022-02-14 ENCOUNTER — OFFICE VISIT (OUTPATIENT)
Dept: FAMILY MEDICINE CLINIC | Facility: CLINIC | Age: 57
End: 2022-02-14

## 2022-02-14 VITALS
SYSTOLIC BLOOD PRESSURE: 126 MMHG | DIASTOLIC BLOOD PRESSURE: 84 MMHG | BODY MASS INDEX: 30.56 KG/M2 | WEIGHT: 201 LBS | OXYGEN SATURATION: 99 % | TEMPERATURE: 97.8 F | HEART RATE: 76 BPM

## 2022-02-14 DIAGNOSIS — E78.5 HYPERLIPIDEMIA, UNSPECIFIED HYPERLIPIDEMIA TYPE: ICD-10-CM

## 2022-02-14 DIAGNOSIS — M79.10 MUSCLE PAIN: ICD-10-CM

## 2022-02-14 DIAGNOSIS — F41.9 ANXIETY: Primary | ICD-10-CM

## 2022-02-14 PROCEDURE — 99214 OFFICE O/P EST MOD 30 MIN: CPT | Performed by: NURSE PRACTITIONER

## 2022-02-14 RX ORDER — GABAPENTIN 100 MG/1
CAPSULE ORAL
Qty: 90 CAPSULE | Refills: 0 | Status: SHIPPED | OUTPATIENT
Start: 2022-02-14 | End: 2022-03-22

## 2022-02-14 RX ORDER — PREDNISONE 20 MG/1
40 TABLET ORAL DAILY
Qty: 8 TABLET | Refills: 0 | Status: SHIPPED | OUTPATIENT
Start: 2022-02-14 | End: 2022-02-18

## 2022-02-14 RX ORDER — DULOXETIN HYDROCHLORIDE 60 MG/1
60 CAPSULE, DELAYED RELEASE ORAL DAILY
Qty: 90 CAPSULE | Refills: 1 | Status: SHIPPED | OUTPATIENT
Start: 2022-02-14 | End: 2022-08-16

## 2022-02-14 RX ORDER — EZETIMIBE 10 MG/1
10 TABLET ORAL DAILY
Qty: 90 TABLET | Refills: 1 | Status: SHIPPED | OUTPATIENT
Start: 2022-02-14 | End: 2022-08-14

## 2022-02-14 NOTE — PATIENT INSTRUCTIONS
Increase cymbalta to 60mg daily  Work on diet and exercise  Stop crestor  Start zetia  Complete 4 day course of steroid

## 2022-03-06 RX ORDER — HYDROCHLOROTHIAZIDE 12.5 MG/1
TABLET ORAL
Qty: 30 TABLET | Refills: 0 | Status: SHIPPED | OUTPATIENT
Start: 2022-03-06 | End: 2022-03-30

## 2022-03-22 RX ORDER — GABAPENTIN 100 MG/1
CAPSULE ORAL
Qty: 90 CAPSULE | Refills: 0 | Status: SHIPPED | OUTPATIENT
Start: 2022-03-22 | End: 2022-04-21

## 2022-03-29 ENCOUNTER — OFFICE VISIT (OUTPATIENT)
Dept: FAMILY MEDICINE CLINIC | Facility: CLINIC | Age: 57
End: 2022-03-29

## 2022-03-29 VITALS
WEIGHT: 201 LBS | OXYGEN SATURATION: 98 % | TEMPERATURE: 97.8 F | HEIGHT: 68 IN | DIASTOLIC BLOOD PRESSURE: 90 MMHG | SYSTOLIC BLOOD PRESSURE: 130 MMHG | RESPIRATION RATE: 16 BRPM | HEART RATE: 84 BPM | BODY MASS INDEX: 30.46 KG/M2

## 2022-03-29 DIAGNOSIS — R00.0 TACHYCARDIA: Primary | ICD-10-CM

## 2022-03-29 PROCEDURE — 99213 OFFICE O/P EST LOW 20 MIN: CPT | Performed by: NURSE PRACTITIONER

## 2022-03-29 RX ORDER — BUSPIRONE HYDROCHLORIDE 5 MG/1
5 TABLET ORAL 2 TIMES DAILY
Qty: 60 TABLET | Refills: 0 | Status: SHIPPED | OUTPATIENT
Start: 2022-03-29 | End: 2022-04-13

## 2022-03-29 RX ORDER — DICLOFENAC SODIUM 20 MG/G
2 SOLUTION TOPICAL DAILY PRN
COMMUNITY
Start: 2022-02-24

## 2022-03-29 NOTE — PROGRESS NOTES
Subjective     Asya Lambert is a 56 y.o. female.     Pt is here today with c/o tachycardia.  Pt reports that she first noticed her heart racing a couple of weeks ago.  Pt states that it comes and goes.  Sometimes it wakes her up in the night.  She doesn't feel like any certain activities make it worse.  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  Denies chest pain or shortness of breath.  No dizziness or headaches.  Denies any cardiac history in the past.  Denies any new medication changes or recent illness.  Pt states that she drinks about 10 oz of coffee in the morning.  Otherwise, she reports she drinks water and caffeine-free beverages.  Pt does have a history of anxiety.  She is currently taking Cymbalta 60mg daily and has Xanax .5mg as needed.  Pt reports that she has recently started a new job but feels that it is less stressful.  She also states that they had to put their dog down last week; however, her symptoms were occurring before this.  Pt states that she rarely uses her Xanax. She states that she last used it last week.   Overall, patient feels like her anxiety is controlled however she has been having some break through anxiety.  Pt has tried Zoloft and Celexa in the past.   Pt also states that she has a Mirena, so she is not having periods right now.  She is wondering if it could be her hormones.   She has been experiencing an increase in hot flashes recently.  She sees GYN, Dr. Gutierrez.  She has palpitations daily.   She does feel like it is likely anxiety driven       The following portions of the patient's history were reviewed and updated as appropriate: allergies, current medications, past family history, past medical history, past social history, past surgical history and problem list.    Review of Systems   Constitutional: Negative for chills, fatigue and fever.   Eyes: Negative for blurred vision, double vision and visual disturbance.   Respiratory:  "Negative for cough, chest tightness, shortness of breath and wheezing.    Cardiovascular: Positive for palpitations. Negative for chest pain.   Neurological: Negative for dizziness, light-headedness and headache.   Psychiatric/Behavioral: The patient is nervous/anxious.        Objective     /90 (BP Location: Left arm, Patient Position: Sitting, Cuff Size: Adult)   Pulse 84   Temp 97.8 °F (36.6 °C) (Temporal)   Resp 16   Ht 172.7 cm (68\")   Wt 91.2 kg (201 lb)   SpO2 98%   BMI 30.56 kg/m²     Current Outpatient Medications on File Prior to Visit   Medication Sig Dispense Refill   • Diclofenac Sodium (Pennsaid) 2 % solution Apply 2 Pump topically to the appropriate area as directed Daily As Needed.     • ALPRAZolam (XANAX) 0.5 MG tablet TAKE 1/2 TO 1 TABLET BY MOUTH DAILY AS NEEDED FOR ANXIETY     • Cholecalciferol 50 MCG (2000 UT) capsule      • DULoxetine (Cymbalta) 60 MG capsule Take 1 capsule by mouth Daily. 90 capsule 1   • ezetimibe (Zetia) 10 MG tablet Take 1 tablet by mouth Daily. 90 tablet 1   • gabapentin (NEURONTIN) 100 MG capsule TAKE 1 CAPSULE BY MOUTH THREE TIMES DAILY 90 capsule 0   • hydroCHLOROthiazide (HYDRODIURIL) 12.5 MG tablet TAKE ONE TABLET BY MOUTH EVERY DAY 30 tablet 0   • levonorgestrel (MIRENA) 20 MCG/24HR IUD 1 each by Intrauterine route.     • loratadine (CLARITIN) 10 MG tablet Take 10 mg by mouth Daily.     • omeprazole (priLOSEC) 20 MG capsule Take 20 mg by mouth Daily.       No current facility-administered medications on file prior to visit.        Physical Exam  Constitutional:       General: She is not in acute distress.     Appearance: Normal appearance. She is obese. She is not ill-appearing.   HENT:      Head: Normocephalic and atraumatic.   Cardiovascular:      Rate and Rhythm: Normal rate and regular rhythm.      Pulses: Normal pulses.      Heart sounds: Normal heart sounds. No murmur heard.  Pulmonary:      Effort: Pulmonary effort is normal. No respiratory " distress.      Breath sounds: Normal breath sounds. No wheezing.   Musculoskeletal:         General: Normal range of motion.   Skin:     General: Skin is warm.   Neurological:      General: No focal deficit present.      Mental Status: She is alert and oriented to person, place, and time.      Cranial Nerves: No cranial nerve deficit.      Motor: No weakness.   Psychiatric:         Mood and Affect: Mood normal.         Behavior: Behavior normal.         Thought Content: Thought content normal.         Judgment: Judgment normal.           Assessment/Plan     Diagnoses and all orders for this visit:    1. Tachycardia (Primary)  Comments:  likely break through anxiety  will start buspar bid  sent update in 1 wk  limit caffeine  if no improvement get holter monitor    Orders:  -     busPIRone (BUSPAR) 5 MG tablet; Take 1 tablet by mouth 2 (Two) Times a Day.  Dispense: 60 tablet; Refill: 0

## 2022-03-29 NOTE — PATIENT INSTRUCTIONS
Add in buspar 5mg 2 times daily  Limit caffeine  Work on deep breathing exercises  Message me in 1 wk with update

## 2022-03-30 RX ORDER — HYDROCHLOROTHIAZIDE 12.5 MG/1
TABLET ORAL
Qty: 30 TABLET | Refills: 0 | Status: SHIPPED | OUTPATIENT
Start: 2022-03-30 | End: 2022-05-04

## 2022-04-13 RX ORDER — BUSPIRONE HYDROCHLORIDE 10 MG/1
10 TABLET ORAL 2 TIMES DAILY
Qty: 60 TABLET | Refills: 0 | Status: SHIPPED | OUTPATIENT
Start: 2022-04-13 | End: 2022-05-13 | Stop reason: SDUPTHER

## 2022-04-21 RX ORDER — GABAPENTIN 100 MG/1
CAPSULE ORAL
Qty: 90 CAPSULE | Refills: 0 | Status: SHIPPED | OUTPATIENT
Start: 2022-04-21 | End: 2022-05-20

## 2022-05-04 RX ORDER — HYDROCHLOROTHIAZIDE 12.5 MG/1
TABLET ORAL
Qty: 30 TABLET | Refills: 0 | Status: SHIPPED | OUTPATIENT
Start: 2022-05-04 | End: 2022-05-31

## 2022-05-12 NOTE — PROGRESS NOTES
"Subjective     Asya Lambert is a 56 y.o. female.     Patient is here today for 3-month follow-up on tachycardia and anxiety.  Patient had been experiencing some palpitations off and on and BuSpar was added.  She is currently on BuSpar 10 mg 2 times daily and Cymbalta 60 for anxiety  Patient reports that she is still having palpitations.  She has been very stressed out worrying about her mother.  She has had anxiety still.  The buspar has helped some.  She has the palpitations daily.   She has occasional pain in her chest.  She does not drink much caffeine.  She drinks alcohol 3-4 times a week- 1 small bourbon glass  Denies SOA, dizziness, HA.  Denies any SI or HI.        The following portions of the patient's history were reviewed and updated as appropriate: allergies, current medications, past family history, past medical history, past social history, past surgical history and problem list.    Review of Systems   Constitutional: Negative for chills, fatigue and fever.   Respiratory: Negative for chest tightness and shortness of breath.    Cardiovascular: Positive for chest pain and palpitations.   Gastrointestinal: Negative for abdominal pain, vomiting and indigestion.   Neurological: Negative for dizziness and headache.   Psychiatric/Behavioral: Positive for stress. Negative for self-injury, suicidal ideas and depressed mood. The patient is nervous/anxious.        Objective     /91 (BP Location: Left arm, Patient Position: Sitting, Cuff Size: Adult)   Pulse 78   Temp 98.2 °F (36.8 °C) (Temporal)   Resp 16   Ht 172.7 cm (68\")   Wt 90.3 kg (199 lb)   SpO2 99%   BMI 30.26 kg/m²     Current Outpatient Medications on File Prior to Visit   Medication Sig Dispense Refill   • ALPRAZolam (XANAX) 0.5 MG tablet TAKE 1/2 TO 1 TABLET BY MOUTH DAILY AS NEEDED FOR ANXIETY     • Cholecalciferol 50 MCG (2000 UT) capsule      • Diclofenac Sodium (Pennsaid) 2 % solution Apply 2 Pump topically to the appropriate area as " directed Daily As Needed.     • DULoxetine (Cymbalta) 60 MG capsule Take 1 capsule by mouth Daily. 90 capsule 1   • ezetimibe (Zetia) 10 MG tablet Take 1 tablet by mouth Daily. 90 tablet 1   • gabapentin (NEURONTIN) 100 MG capsule TAKE 1 CAPSULE BY MOUTH THREE TIMES DAILY 90 capsule 0   • hydroCHLOROthiazide (HYDRODIURIL) 12.5 MG tablet TAKE ONE TABLET BY MOUTH EVERY DAY 30 tablet 0   • levonorgestrel (MIRENA) 20 MCG/24HR IUD 1 each by Intrauterine route.     • loratadine (CLARITIN) 10 MG tablet Take 10 mg by mouth Daily.     • omeprazole (priLOSEC) 20 MG capsule Take 20 mg by mouth Daily.     • [DISCONTINUED] busPIRone (BUSPAR) 10 MG tablet Take 1 tablet by mouth 2 (Two) Times a Day. 60 tablet 0     No current facility-administered medications on file prior to visit.        Physical Exam  Constitutional:       Appearance: Normal appearance. She is not ill-appearing.   HENT:      Head: Normocephalic and atraumatic.   Cardiovascular:      Rate and Rhythm: Normal rate and regular rhythm.      Heart sounds: No murmur heard.  Pulmonary:      Effort: Pulmonary effort is normal. No respiratory distress.      Breath sounds: Normal breath sounds.   Skin:     General: Skin is warm and dry.   Neurological:      General: No focal deficit present.      Mental Status: She is alert and oriented to person, place, and time.   Psychiatric:         Mood and Affect: Mood normal.         Behavior: Behavior normal.         Thought Content: Thought content normal.         Judgment: Judgment normal.           Assessment & Plan     Diagnoses and all orders for this visit:    1. Palpitations (Primary)  Comments:  possibly anxiety  start low dose metoprolol XL  f/u 1 mo  get holter monitor  wants to hold off on cardiology  Orders:  -     Holter monitor - 48 hour; Future  -     metoprolol succinate XL (Toprol XL) 25 MG 24 hr tablet; Take 0.5 tablets by mouth Daily.  Dispense: 30 tablet; Refill: 0    2. Anxiety  Comments:  continue buspar 10mg  bid and cymbalta  Orders:  -     busPIRone (BUSPAR) 10 MG tablet; Take 1 tablet by mouth 2 (Two) Times a Day.  Dispense: 180 tablet; Refill: 0

## 2022-05-13 ENCOUNTER — OFFICE VISIT (OUTPATIENT)
Dept: FAMILY MEDICINE CLINIC | Facility: CLINIC | Age: 57
End: 2022-05-13

## 2022-05-13 VITALS
DIASTOLIC BLOOD PRESSURE: 91 MMHG | TEMPERATURE: 98.2 F | BODY MASS INDEX: 30.16 KG/M2 | HEART RATE: 78 BPM | WEIGHT: 199 LBS | RESPIRATION RATE: 16 BRPM | HEIGHT: 68 IN | SYSTOLIC BLOOD PRESSURE: 132 MMHG | OXYGEN SATURATION: 99 %

## 2022-05-13 DIAGNOSIS — R00.2 PALPITATIONS: Primary | ICD-10-CM

## 2022-05-13 DIAGNOSIS — F41.9 ANXIETY: ICD-10-CM

## 2022-05-13 PROCEDURE — 99214 OFFICE O/P EST MOD 30 MIN: CPT | Performed by: NURSE PRACTITIONER

## 2022-05-13 RX ORDER — METOPROLOL SUCCINATE 25 MG/1
12.5 TABLET, EXTENDED RELEASE ORAL DAILY
Qty: 30 TABLET | Refills: 0 | Status: SHIPPED | OUTPATIENT
Start: 2022-05-13 | End: 2022-06-21 | Stop reason: SDUPTHER

## 2022-05-13 RX ORDER — BUSPIRONE HYDROCHLORIDE 10 MG/1
10 TABLET ORAL 2 TIMES DAILY
Qty: 180 TABLET | Refills: 0 | Status: SHIPPED | OUTPATIENT
Start: 2022-05-13 | End: 2022-08-22

## 2022-05-13 NOTE — PATIENT INSTRUCTIONS
Complete holter monitor  Start metoprolol 12.5mg daily  Let me know if no improvement  Call for issues or concerns

## 2022-05-20 RX ORDER — GABAPENTIN 100 MG/1
CAPSULE ORAL
Qty: 90 CAPSULE | Refills: 0 | Status: SHIPPED | OUTPATIENT
Start: 2022-05-20 | End: 2022-06-21 | Stop reason: SDUPTHER

## 2022-05-31 ENCOUNTER — HOSPITAL ENCOUNTER (OUTPATIENT)
Dept: RESPIRATORY THERAPY | Facility: HOSPITAL | Age: 57
Discharge: HOME | End: 2022-05-31
Admitting: NURSE PRACTITIONER

## 2022-05-31 DIAGNOSIS — R00.2 PALPITATIONS: ICD-10-CM

## 2022-05-31 PROCEDURE — 93225 XTRNL ECG REC<48 HRS REC: CPT

## 2022-05-31 PROCEDURE — 93242 EXT ECG>48HR<7D RECORDING: CPT

## 2022-05-31 RX ORDER — HYDROCHLOROTHIAZIDE 12.5 MG/1
TABLET ORAL
Qty: 30 TABLET | Refills: 0 | Status: SHIPPED | OUTPATIENT
Start: 2022-05-31 | End: 2022-07-05

## 2022-06-07 PROCEDURE — 93244 EXT ECG>48HR<7D REV&INTERPJ: CPT | Performed by: INTERNAL MEDICINE

## 2022-06-20 NOTE — PROGRESS NOTES
Subjective     Asya Lambert is a 56 y.o. female.     Patient is here today for 1 month follow-up on heart palpitations.  It was previously believed that these were related to anxiety.  Last month she was started on low-dose metoprolol XL 12.5 mg daily.  Patient underwent a cardiac monitor which was normal.  Since starting the medication patient reports that she has seen some improvement.  She is still having some palpitations throughout the day.  She is happy with her anxiety regimen.  Denies CP, dizziness, HA.  Occasional SOA with exertion.          The following portions of the patient's history were reviewed and updated as appropriate: allergies, current medications, past family history, past medical history, past social history, past surgical history and problem list.    Review of Systems   Constitutional: Negative for chills, fatigue and fever.   Respiratory: Negative for chest tightness and shortness of breath.    Cardiovascular: Negative for chest pain and palpitations.   Neurological: Negative for dizziness and headache.   Psychiatric/Behavioral: Positive for stress. Negative for self-injury, suicidal ideas and depressed mood. The patient is nervous/anxious.        Objective     /87   Pulse 76   Temp 98.6 °F (37 °C) (Tympanic)   Wt 89.8 kg (198 lb)   SpO2 97%   BMI 30.11 kg/m²     Current Outpatient Medications on File Prior to Visit   Medication Sig Dispense Refill   • ALPRAZolam (XANAX) 0.5 MG tablet TAKE 1/2 TO 1 TABLET BY MOUTH DAILY AS NEEDED FOR ANXIETY     • busPIRone (BUSPAR) 10 MG tablet Take 1 tablet by mouth 2 (Two) Times a Day. 180 tablet 0   • Cholecalciferol 50 MCG (2000 UT) capsule      • Diclofenac Sodium (Pennsaid) 2 % solution Apply 2 Pump topically to the appropriate area as directed Daily As Needed.     • DULoxetine (Cymbalta) 60 MG capsule Take 1 capsule by mouth Daily. 90 capsule 1   • ezetimibe (Zetia) 10 MG tablet Take 1 tablet by mouth Daily. 90 tablet 1   •  hydroCHLOROthiazide (HYDRODIURIL) 12.5 MG tablet TAKE ONE TABLET BY MOUTH EVERY DAY 30 tablet 0   • levonorgestrel (MIRENA) 20 MCG/24HR IUD 1 each by Intrauterine route.     • loratadine (CLARITIN) 10 MG tablet Take 10 mg by mouth Daily.     • omeprazole (priLOSEC) 20 MG capsule Take 20 mg by mouth Daily.     • [DISCONTINUED] gabapentin (NEURONTIN) 100 MG capsule TAKE 1 CAPSULE BY MOUTH THREE TIMES DAILY 90 capsule 0   • [DISCONTINUED] metoprolol succinate XL (Toprol XL) 25 MG 24 hr tablet Take 0.5 tablets by mouth Daily. 30 tablet 0     No current facility-administered medications on file prior to visit.        Physical Exam  Vitals reviewed.   Constitutional:       General: She is not in acute distress.     Appearance: Normal appearance. She is well-developed. She is not diaphoretic.   HENT:      Head: Normocephalic and atraumatic.   Eyes:      General:         Right eye: No discharge.         Left eye: No discharge.      Extraocular Movements: Extraocular movements intact.      Conjunctiva/sclera: Conjunctivae normal.   Cardiovascular:      Rate and Rhythm: Normal rate and regular rhythm.      Heart sounds: No murmur heard.  Pulmonary:      Effort: Pulmonary effort is normal. No respiratory distress.      Breath sounds: Normal breath sounds. No wheezing or rales.   Abdominal:      General: Bowel sounds are normal.      Palpations: Abdomen is soft.   Musculoskeletal:         General: Normal range of motion.      Cervical back: Normal range of motion.   Skin:     General: Skin is warm and dry.   Neurological:      General: No focal deficit present.      Mental Status: She is alert and oriented to person, place, and time.   Psychiatric:         Mood and Affect: Mood normal.         Behavior: Behavior normal.         Thought Content: Thought content normal.         Judgment: Judgment normal.           Assessment & Plan     Diagnoses and all orders for this visit:    1. Palpitations  (Primary)  Comments:  improved  holter was normal  occ in the morning  inc metoprolol to 25mg daily  update in 1 mo  f/u 3 mo  Orders:  -     metoprolol succinate XL (Toprol XL) 25 MG 24 hr tablet; Take 1 tablet by mouth Daily.  Dispense: 90 tablet; Refill: 1    2. Breast cancer screening by mammogram  -     Mammo Screening Digital Tomosynthesis Bilateral With CAD; Future    Other orders  -     gabapentin (NEURONTIN) 100 MG capsule; Take 1 capsule by mouth 3 (Three) Times a Day.  Dispense: 90 capsule; Refill: 1

## 2022-06-21 ENCOUNTER — OFFICE VISIT (OUTPATIENT)
Dept: FAMILY MEDICINE CLINIC | Facility: CLINIC | Age: 57
End: 2022-06-21

## 2022-06-21 VITALS
WEIGHT: 198 LBS | HEART RATE: 76 BPM | OXYGEN SATURATION: 97 % | SYSTOLIC BLOOD PRESSURE: 131 MMHG | DIASTOLIC BLOOD PRESSURE: 87 MMHG | TEMPERATURE: 98.6 F | BODY MASS INDEX: 30.11 KG/M2

## 2022-06-21 DIAGNOSIS — Z12.31 BREAST CANCER SCREENING BY MAMMOGRAM: ICD-10-CM

## 2022-06-21 DIAGNOSIS — R00.2 PALPITATIONS: Primary | ICD-10-CM

## 2022-06-21 PROCEDURE — 99213 OFFICE O/P EST LOW 20 MIN: CPT | Performed by: NURSE PRACTITIONER

## 2022-06-21 RX ORDER — METOPROLOL SUCCINATE 25 MG/1
25 TABLET, EXTENDED RELEASE ORAL DAILY
Qty: 90 TABLET | Refills: 1 | Status: SHIPPED | OUTPATIENT
Start: 2022-06-21 | End: 2023-01-23 | Stop reason: SDUPTHER

## 2022-06-21 RX ORDER — GABAPENTIN 100 MG/1
100 CAPSULE ORAL 3 TIMES DAILY
Qty: 90 CAPSULE | Refills: 1 | Status: SHIPPED | OUTPATIENT
Start: 2022-06-21 | End: 2022-08-22

## 2022-07-05 RX ORDER — HYDROCHLOROTHIAZIDE 12.5 MG/1
TABLET ORAL
Qty: 30 TABLET | Refills: 0 | Status: SHIPPED | OUTPATIENT
Start: 2022-07-05 | End: 2022-08-05

## 2022-08-05 RX ORDER — HYDROCHLOROTHIAZIDE 12.5 MG/1
TABLET ORAL
Qty: 90 TABLET | Refills: 1 | Status: SHIPPED | OUTPATIENT
Start: 2022-08-05 | End: 2022-11-02 | Stop reason: SDUPTHER

## 2022-08-12 DIAGNOSIS — E78.5 HYPERLIPIDEMIA, UNSPECIFIED HYPERLIPIDEMIA TYPE: ICD-10-CM

## 2022-08-14 RX ORDER — EZETIMIBE 10 MG/1
10 TABLET ORAL DAILY
Qty: 90 TABLET | Refills: 0 | Status: SHIPPED | OUTPATIENT
Start: 2022-08-14 | End: 2022-12-02

## 2022-08-16 DIAGNOSIS — M79.10 MUSCLE PAIN: ICD-10-CM

## 2022-08-16 DIAGNOSIS — F41.9 ANXIETY: ICD-10-CM

## 2022-08-16 RX ORDER — DULOXETIN HYDROCHLORIDE 60 MG/1
CAPSULE, DELAYED RELEASE ORAL
Qty: 90 CAPSULE | Refills: 0 | Status: SHIPPED | OUTPATIENT
Start: 2022-08-16 | End: 2022-09-21

## 2022-08-22 DIAGNOSIS — F41.9 ANXIETY: ICD-10-CM

## 2022-08-22 RX ORDER — GABAPENTIN 100 MG/1
CAPSULE ORAL
Qty: 90 CAPSULE | Refills: 0 | Status: SHIPPED | OUTPATIENT
Start: 2022-08-22 | End: 2022-09-21 | Stop reason: SDUPTHER

## 2022-08-22 RX ORDER — BUSPIRONE HYDROCHLORIDE 10 MG/1
10 TABLET ORAL 2 TIMES DAILY
Qty: 180 TABLET | Refills: 0 | Status: SHIPPED | OUTPATIENT
Start: 2022-08-22 | End: 2022-11-23

## 2022-09-21 ENCOUNTER — OFFICE VISIT (OUTPATIENT)
Dept: FAMILY MEDICINE CLINIC | Facility: CLINIC | Age: 57
End: 2022-09-21

## 2022-09-21 ENCOUNTER — LAB (OUTPATIENT)
Dept: FAMILY MEDICINE CLINIC | Facility: CLINIC | Age: 57
End: 2022-09-21

## 2022-09-21 VITALS
OXYGEN SATURATION: 98 % | HEART RATE: 80 BPM | WEIGHT: 200 LBS | BODY MASS INDEX: 30.41 KG/M2 | SYSTOLIC BLOOD PRESSURE: 134 MMHG | DIASTOLIC BLOOD PRESSURE: 89 MMHG | TEMPERATURE: 97.7 F

## 2022-09-21 DIAGNOSIS — Z00.00 PREVENTATIVE HEALTH CARE: ICD-10-CM

## 2022-09-21 DIAGNOSIS — R00.2 PALPITATIONS: ICD-10-CM

## 2022-09-21 DIAGNOSIS — F41.9 ANXIETY: Primary | ICD-10-CM

## 2022-09-21 DIAGNOSIS — G62.9 NEUROPATHY: ICD-10-CM

## 2022-09-21 DIAGNOSIS — E78.5 HYPERLIPIDEMIA, UNSPECIFIED HYPERLIPIDEMIA TYPE: ICD-10-CM

## 2022-09-21 DIAGNOSIS — I10 HYPERTENSION, UNSPECIFIED TYPE: ICD-10-CM

## 2022-09-21 LAB
ALBUMIN SERPL-MCNC: 4.7 G/DL (ref 3.5–5.2)
ALBUMIN/GLOB SERPL: 1.7 G/DL
ALP SERPL-CCNC: 105 U/L (ref 39–117)
ALT SERPL W P-5'-P-CCNC: 24 U/L (ref 1–33)
ANION GAP SERPL CALCULATED.3IONS-SCNC: 12.4 MMOL/L (ref 5–15)
AST SERPL-CCNC: 26 U/L (ref 1–32)
BILIRUB SERPL-MCNC: 0.6 MG/DL (ref 0–1.2)
BUN SERPL-MCNC: 14 MG/DL (ref 6–20)
BUN/CREAT SERPL: 13.7 (ref 7–25)
CALCIUM SPEC-SCNC: 10 MG/DL (ref 8.6–10.5)
CHLORIDE SERPL-SCNC: 101 MMOL/L (ref 98–107)
CHOLEST SERPL-MCNC: 277 MG/DL (ref 0–200)
CO2 SERPL-SCNC: 27.6 MMOL/L (ref 22–29)
CREAT SERPL-MCNC: 1.02 MG/DL (ref 0.57–1)
EGFRCR SERPLBLD CKD-EPI 2021: 64.7 ML/MIN/1.73
GLOBULIN UR ELPH-MCNC: 2.8 GM/DL
GLUCOSE SERPL-MCNC: 84 MG/DL (ref 65–99)
HCV AB SER DONR QL: NORMAL
HDLC SERPL-MCNC: 67 MG/DL (ref 40–60)
LDLC SERPL CALC-MCNC: 161 MG/DL (ref 0–100)
LDLC/HDLC SERPL: 2.34 {RATIO}
POTASSIUM SERPL-SCNC: 5.2 MMOL/L (ref 3.5–5.2)
PROT SERPL-MCNC: 7.5 G/DL (ref 6–8.5)
SODIUM SERPL-SCNC: 141 MMOL/L (ref 136–145)
TRIGL SERPL-MCNC: 265 MG/DL (ref 0–150)
VLDLC SERPL-MCNC: 49 MG/DL (ref 5–40)

## 2022-09-21 PROCEDURE — 99214 OFFICE O/P EST MOD 30 MIN: CPT | Performed by: NURSE PRACTITIONER

## 2022-09-21 PROCEDURE — 80053 COMPREHEN METABOLIC PANEL: CPT | Performed by: NURSE PRACTITIONER

## 2022-09-21 PROCEDURE — 36415 COLL VENOUS BLD VENIPUNCTURE: CPT

## 2022-09-21 PROCEDURE — 80061 LIPID PANEL: CPT | Performed by: NURSE PRACTITIONER

## 2022-09-21 PROCEDURE — 86803 HEPATITIS C AB TEST: CPT | Performed by: NURSE PRACTITIONER

## 2022-09-21 RX ORDER — GABAPENTIN 100 MG/1
100 CAPSULE ORAL 3 TIMES DAILY
Qty: 270 CAPSULE | Refills: 1 | Status: SHIPPED | OUTPATIENT
Start: 2022-09-21 | End: 2023-03-23

## 2022-09-21 RX ORDER — ESCITALOPRAM OXALATE 10 MG/1
10 TABLET ORAL DAILY
Qty: 30 TABLET | Refills: 0 | Status: SHIPPED | OUTPATIENT
Start: 2022-09-21 | End: 2022-10-20

## 2022-09-21 NOTE — PROGRESS NOTES
Answers for HPI/ROS submitted by the patient on 9/20/2022  Please describe your symptoms.: F/U on heart rate & Medication  Have you had these symptoms before?: Yes  How long have you been having these symptoms?: Greater than 2 weeks  What is the primary reason for your visit?: Other    Subjective     Asya Lambert is a 57 y.o. female.     History of Present Illness  Patient is here today to follow-up on hypertension, palpitations, hyperlipidemia, anxiety, neuropathy.  Has had some sinus drainage and pressure in the ears.  Not on any allergy medication.   Denies cough, fever, fatigue, chills.     Hypertension-patient is currently on hydrochlorothiazide 12.5 mg daily and metoprolol XL 25 mg daily.    Palpitations-patient is currently on metoprolol XL 25 mg daily.  She has had a Holter monitor which was benign.  She states her palpitations have improved.    Hyperlipidemia- patient is currently on Zetia 10 mg daily. Staying active. Eating well.     Anxiety-patient is currently on Cymbalta 60 mg daily, BuSpar 10 mg twice daily, and Xanax as needed.  She is also on the Cymbalta for muscle pain. She states her anxiety has been worse. She hasnt had any xanax in the last 4-5 weeks.  lost his job on Friday. She will do a marijuana gummy once weekly and it tends to help the most. She has been on celexa, lexapro, zoloft in the past. She would like to go back to lexapro. hasnt seen much of a difference in pain with the cymbalta. Denies SI or HI.     Neuropathy- she is currently on gabapentin 100mg TID. She has a history of cervical fusions. She has some restless leg.      Labs- due  Pap smear- UTD- Dr. Gutierrez- has mirena, she had it taken out and was still having menstrual cycles- she got it back in (aug 2018) due to cycle issues.   Mammogram- 6/21/11  DEXA-  Colonoscopy- 5 yrs ago     Vaccines:  Flu- UTD  PNA- N/A  Shingles- will check with pharmacy  Tdap- 2017  Covid-19- had one dose     Dental exam-  Eye exam-            The following portions of the patient's history were reviewed and updated as appropriate: allergies, current medications, past family history, past medical history, past social history, past surgical history and problem list.    Review of Systems   Constitutional: Negative for appetite change, chills, fatigue and fever.   HENT: Positive for congestion, rhinorrhea and sinus pressure.    Respiratory: Negative for cough, chest tightness, shortness of breath and wheezing.    Cardiovascular: Negative for chest pain and palpitations.   Gastrointestinal: Negative for abdominal pain, blood in stool, constipation, diarrhea, nausea and vomiting.   Genitourinary: Negative for dysuria, flank pain, frequency and urgency.   Musculoskeletal: Positive for arthralgias.   Skin: Negative for rash and skin lesions.   Neurological: Negative for dizziness, weakness, numbness and headache.   Psychiatric/Behavioral: Positive for stress. Negative for self-injury, suicidal ideas and depressed mood. The patient is nervous/anxious.        Objective     /89 (BP Location: Left arm, Patient Position: Sitting, Cuff Size: Adult)   Pulse 80   Temp 97.7 °F (36.5 °C) (Tympanic)   Wt 90.7 kg (200 lb)   SpO2 98%   BMI 30.41 kg/m²     Current Outpatient Medications on File Prior to Visit   Medication Sig Dispense Refill   • ALPRAZolam (XANAX) 0.5 MG tablet TAKE 1/2 TO 1 TABLET BY MOUTH DAILY AS NEEDED FOR ANXIETY     • busPIRone (BUSPAR) 10 MG tablet TAKE 1 TABLET BY MOUTH 2 (TWO) TIMES A DAY. 180 tablet 0   • Cholecalciferol 50 MCG (2000 UT) capsule      • Diclofenac Sodium (Pennsaid) 2 % solution Apply 2 Pump topically to the appropriate area as directed Daily As Needed.     • ezetimibe (ZETIA) 10 MG tablet TAKE 1 TABLET BY MOUTH DAILY. 90 tablet 0   • hydroCHLOROthiazide (HYDRODIURIL) 12.5 MG tablet TAKE ONE TABLET BY MOUTH EVERY DAY 90 tablet 1   • levonorgestrel (MIRENA) 20 MCG/24HR IUD 1 each by Intrauterine route.     • loratadine  (CLARITIN) 10 MG tablet Take 10 mg by mouth Daily.     • metoprolol succinate XL (Toprol XL) 25 MG 24 hr tablet Take 1 tablet by mouth Daily. 90 tablet 1   • omeprazole (priLOSEC) 20 MG capsule Take 20 mg by mouth Daily.     • [DISCONTINUED] DULoxetine (CYMBALTA) 60 MG capsule TAKE 1 CAPSULE BY MOUTH DAILY. (DOSE INCREASE) 90 capsule 0   • [DISCONTINUED] gabapentin (NEURONTIN) 100 MG capsule TAKE 1 CAPSULE BY MOUTH THREE TIMES DAILY 90 capsule 0     No current facility-administered medications on file prior to visit.        Physical Exam  Vitals reviewed.   Constitutional:       General: She is not in acute distress.     Appearance: Normal appearance. She is well-developed. She is not diaphoretic.   HENT:      Head: Normocephalic and atraumatic.   Eyes:      General:         Right eye: No discharge.         Left eye: No discharge.      Extraocular Movements: Extraocular movements intact.      Conjunctiva/sclera: Conjunctivae normal.   Cardiovascular:      Rate and Rhythm: Normal rate and regular rhythm.      Heart sounds: No murmur heard.  Pulmonary:      Effort: Pulmonary effort is normal. No respiratory distress.      Breath sounds: Normal breath sounds. No wheezing or rales.   Abdominal:      General: Bowel sounds are normal.      Palpations: Abdomen is soft.   Musculoskeletal:         General: Normal range of motion.      Cervical back: Normal range of motion.   Skin:     General: Skin is warm and dry.   Neurological:      General: No focal deficit present.      Mental Status: She is alert and oriented to person, place, and time.   Psychiatric:         Mood and Affect: Mood normal.         Behavior: Behavior normal.         Thought Content: Thought content normal.         Judgment: Judgment normal.           Assessment & Plan     Diagnoses and all orders for this visit:    1. Anxiety (Primary)  Comments:  deteriorated  stop cymbalta  start lexapro  f/u 1 mo  denies SI or HI  Orders:  -     escitalopram (Lexapro)  10 MG tablet; Take 1 tablet by mouth Daily.  Dispense: 30 tablet; Refill: 0    2. Hyperlipidemia, unspecified hyperlipidemia type  Comments:  work on diet and exercise  check labs    3. Preventative health care  Comments:  work on diet and exercise  check labs  Orders:  -     Comprehensive Metabolic Panel; Future  -     Lipid Panel; Future  -     Hepatitis C Antibody; Future    4. Palpitations  Comments:  resolved  cont metoprolol  call for issues    5. Hypertension, unspecified type  Comments:  work on diet and exercise  check labs  cont Hctz and metoprolol    6. Neuropathy  Comments:  stable   cont meds    Orders:  -     gabapentin (NEURONTIN) 100 MG capsule; Take 1 capsule by mouth 3 (Three) Times a Day.  Dispense: 270 capsule; Refill: 1

## 2022-09-22 DIAGNOSIS — E78.5 HYPERLIPIDEMIA, UNSPECIFIED HYPERLIPIDEMIA TYPE: Primary | ICD-10-CM

## 2022-09-22 RX ORDER — PRAVASTATIN SODIUM 10 MG
10 TABLET ORAL DAILY
Qty: 90 TABLET | Refills: 0 | Status: SHIPPED | OUTPATIENT
Start: 2022-09-22 | End: 2023-01-23

## 2022-10-20 DIAGNOSIS — F41.9 ANXIETY: ICD-10-CM

## 2022-10-20 RX ORDER — ESCITALOPRAM OXALATE 10 MG/1
10 TABLET ORAL DAILY
Qty: 30 TABLET | Refills: 0 | Status: SHIPPED | OUTPATIENT
Start: 2022-10-20 | End: 2022-11-02

## 2022-11-02 ENCOUNTER — OFFICE VISIT (OUTPATIENT)
Dept: FAMILY MEDICINE CLINIC | Facility: CLINIC | Age: 57
End: 2022-11-02

## 2022-11-02 VITALS
SYSTOLIC BLOOD PRESSURE: 117 MMHG | OXYGEN SATURATION: 97 % | BODY MASS INDEX: 29.95 KG/M2 | DIASTOLIC BLOOD PRESSURE: 81 MMHG | TEMPERATURE: 97.3 F | WEIGHT: 197 LBS | HEART RATE: 64 BPM

## 2022-11-02 DIAGNOSIS — E78.5 HYPERLIPIDEMIA, UNSPECIFIED HYPERLIPIDEMIA TYPE: ICD-10-CM

## 2022-11-02 DIAGNOSIS — F41.9 ANXIETY: Primary | ICD-10-CM

## 2022-11-02 PROCEDURE — 99214 OFFICE O/P EST MOD 30 MIN: CPT | Performed by: NURSE PRACTITIONER

## 2022-11-02 RX ORDER — HYDROCHLOROTHIAZIDE 12.5 MG/1
12.5 TABLET ORAL DAILY
Qty: 90 TABLET | Refills: 1 | Status: SHIPPED | OUTPATIENT
Start: 2022-11-02

## 2022-11-02 RX ORDER — ESCITALOPRAM OXALATE 20 MG/1
20 TABLET ORAL DAILY
Qty: 30 TABLET | Refills: 0 | Status: SHIPPED | OUTPATIENT
Start: 2022-11-02 | End: 2022-12-12

## 2022-11-02 NOTE — PROGRESS NOTES
Subjective     Asya Lambert is a 57 y.o. female.     History of Present Illness  Pt is here today for a 1 mo follow up on anxiety and hyperlipidemia.    Anxiety-last month patient was switched from Cymbalta to Lexapro 10mg daily.  She states she is doing well on it but is wanting to increase it. She is also on BuSpar 10 mg twice daily. Denies SI or HI. Denies side effects.    Hyperlipidemia-patient is tried numerous statins but is unable to tolerate it due to joint pain.  She is currently taking Zetia and fish oil. She watched her diet closely. Her fathers cholesterol has always been high.        The following portions of the patient's history were reviewed and updated as appropriate: allergies, current medications, past family history, past medical history, past social history, past surgical history and problem list.    Review of Systems   Constitutional: Negative for chills, fatigue and fever.   Respiratory: Negative for chest tightness and shortness of breath.    Cardiovascular: Negative for chest pain and palpitations.   Gastrointestinal: Negative for abdominal pain, nausea and vomiting.   Neurological: Negative for dizziness and headache.   Psychiatric/Behavioral: Negative for self-injury, suicidal ideas, depressed mood and stress. The patient is not nervous/anxious.        Objective     /81   Pulse 64   Temp 97.3 °F (36.3 °C) (Tympanic)   Wt 89.4 kg (197 lb)   SpO2 97%   BMI 29.95 kg/m²     Current Outpatient Medications on File Prior to Visit   Medication Sig Dispense Refill   • ALPRAZolam (XANAX) 0.5 MG tablet TAKE 1/2 TO 1 TABLET BY MOUTH DAILY AS NEEDED FOR ANXIETY     • busPIRone (BUSPAR) 10 MG tablet TAKE 1 TABLET BY MOUTH 2 (TWO) TIMES A DAY. 180 tablet 0   • Cholecalciferol 50 MCG (2000 UT) capsule      • Diclofenac Sodium (Pennsaid) 2 % solution Apply 2 Pump topically to the appropriate area as directed Daily As Needed.     • ezetimibe (ZETIA) 10 MG tablet TAKE 1 TABLET BY MOUTH DAILY. 90  tablet 0   • gabapentin (NEURONTIN) 100 MG capsule Take 1 capsule by mouth 3 (Three) Times a Day. 270 capsule 1   • levonorgestrel (MIRENA) 20 MCG/24HR IUD 1 each by Intrauterine route.     • loratadine (CLARITIN) 10 MG tablet Take 10 mg by mouth Daily.     • metoprolol succinate XL (Toprol XL) 25 MG 24 hr tablet Take 1 tablet by mouth Daily. 90 tablet 1   • omeprazole (priLOSEC) 20 MG capsule Take 20 mg by mouth Daily.     • pravastatin (PRAVACHOL) 10 MG tablet Take 1 tablet by mouth Daily. 90 tablet 0   • [DISCONTINUED] escitalopram (LEXAPRO) 10 MG tablet TAKE 1 TABLET BY MOUTH DAILY. 30 tablet 0   • [DISCONTINUED] hydroCHLOROthiazide (HYDRODIURIL) 12.5 MG tablet TAKE ONE TABLET BY MOUTH EVERY DAY 90 tablet 1     No current facility-administered medications on file prior to visit.        Physical Exam  Vitals reviewed.   Constitutional:       General: She is not in acute distress.     Appearance: Normal appearance. She is well-developed. She is not diaphoretic.   HENT:      Head: Normocephalic and atraumatic.   Eyes:      General:         Right eye: No discharge.         Left eye: No discharge.      Extraocular Movements: Extraocular movements intact.      Conjunctiva/sclera: Conjunctivae normal.   Cardiovascular:      Rate and Rhythm: Normal rate and regular rhythm.      Heart sounds: No murmur heard.  Pulmonary:      Effort: Pulmonary effort is normal. No respiratory distress.      Breath sounds: Normal breath sounds. No wheezing or rales.   Abdominal:      General: Bowel sounds are normal.      Palpations: Abdomen is soft.   Musculoskeletal:         General: Normal range of motion.      Cervical back: Normal range of motion.   Skin:     General: Skin is warm and dry.   Neurological:      General: No focal deficit present.      Mental Status: She is alert and oriented to person, place, and time.   Psychiatric:         Mood and Affect: Mood normal.         Behavior: Behavior normal.         Thought Content:  Thought content normal.         Judgment: Judgment normal.           Assessment & Plan     Diagnoses and all orders for this visit:    1. Anxiety (Primary)  Comments:  improved  would like to inc lexapro  denies SI or HI  f/u 3 mo  Orders:  -     escitalopram (Lexapro) 20 MG tablet; Take 1 tablet by mouth Daily.  Dispense: 30 tablet; Refill: 0    2. Hyperlipidemia, unspecified hyperlipidemia type  Comments:  couldnt tolerate statin   cont zetia and fish oil  work on diet and exercise    Other orders  -     hydroCHLOROthiazide (HYDRODIURIL) 12.5 MG tablet; Take 1 tablet by mouth Daily.  Dispense: 90 tablet; Refill: 1

## 2022-11-22 RX ORDER — AMOXICILLIN AND CLAVULANATE POTASSIUM 875; 125 MG/1; MG/1
1 TABLET, FILM COATED ORAL 2 TIMES DAILY
Qty: 20 TABLET | Refills: 0 | Status: SHIPPED | OUTPATIENT
Start: 2022-11-22 | End: 2022-12-02

## 2022-11-23 DIAGNOSIS — F41.9 ANXIETY: ICD-10-CM

## 2022-11-23 RX ORDER — BUSPIRONE HYDROCHLORIDE 10 MG/1
TABLET ORAL
Qty: 180 TABLET | Refills: 0 | Status: SHIPPED | OUTPATIENT
Start: 2022-11-23 | End: 2023-01-23 | Stop reason: SDUPTHER

## 2022-12-02 DIAGNOSIS — E78.5 HYPERLIPIDEMIA, UNSPECIFIED HYPERLIPIDEMIA TYPE: ICD-10-CM

## 2022-12-02 RX ORDER — EZETIMIBE 10 MG/1
10 TABLET ORAL DAILY
Qty: 90 TABLET | Refills: 0 | Status: SHIPPED | OUTPATIENT
Start: 2022-12-02 | End: 2023-03-08

## 2022-12-12 DIAGNOSIS — F41.9 ANXIETY: ICD-10-CM

## 2022-12-12 RX ORDER — ESCITALOPRAM OXALATE 20 MG/1
20 TABLET ORAL DAILY
Qty: 30 TABLET | Refills: 0 | Status: SHIPPED | OUTPATIENT
Start: 2022-12-12 | End: 2023-01-23 | Stop reason: SDUPTHER

## 2023-01-23 ENCOUNTER — OFFICE VISIT (OUTPATIENT)
Dept: FAMILY MEDICINE CLINIC | Facility: CLINIC | Age: 58
End: 2023-01-23
Payer: COMMERCIAL

## 2023-01-23 ENCOUNTER — LAB (OUTPATIENT)
Dept: FAMILY MEDICINE CLINIC | Facility: CLINIC | Age: 58
End: 2023-01-23
Payer: COMMERCIAL

## 2023-01-23 VITALS
TEMPERATURE: 97.8 F | HEART RATE: 60 BPM | DIASTOLIC BLOOD PRESSURE: 85 MMHG | BODY MASS INDEX: 30.11 KG/M2 | SYSTOLIC BLOOD PRESSURE: 133 MMHG | WEIGHT: 198 LBS | OXYGEN SATURATION: 97 %

## 2023-01-23 DIAGNOSIS — E78.5 HYPERLIPIDEMIA, UNSPECIFIED HYPERLIPIDEMIA TYPE: ICD-10-CM

## 2023-01-23 DIAGNOSIS — F41.9 ANXIETY: Primary | ICD-10-CM

## 2023-01-23 DIAGNOSIS — R00.2 PALPITATIONS: ICD-10-CM

## 2023-01-23 LAB
CHOLEST SERPL-MCNC: 246 MG/DL (ref 0–200)
HDLC SERPL-MCNC: 77 MG/DL (ref 40–60)
LDLC SERPL CALC-MCNC: 146 MG/DL (ref 0–100)
LDLC/HDLC SERPL: 1.85 {RATIO}
TRIGL SERPL-MCNC: 134 MG/DL (ref 0–150)
VLDLC SERPL-MCNC: 23 MG/DL (ref 5–40)

## 2023-01-23 PROCEDURE — 36415 COLL VENOUS BLD VENIPUNCTURE: CPT

## 2023-01-23 PROCEDURE — 99214 OFFICE O/P EST MOD 30 MIN: CPT | Performed by: NURSE PRACTITIONER

## 2023-01-23 PROCEDURE — 80061 LIPID PANEL: CPT | Performed by: NURSE PRACTITIONER

## 2023-01-23 RX ORDER — BUSPIRONE HYDROCHLORIDE 10 MG/1
10 TABLET ORAL 2 TIMES DAILY
Qty: 180 TABLET | Refills: 0 | Status: SHIPPED | OUTPATIENT
Start: 2023-01-23

## 2023-01-23 RX ORDER — ESCITALOPRAM OXALATE 20 MG/1
20 TABLET ORAL DAILY
Qty: 90 TABLET | Refills: 1 | Status: SHIPPED | OUTPATIENT
Start: 2023-01-23

## 2023-01-23 RX ORDER — METOPROLOL SUCCINATE 25 MG/1
25 TABLET, EXTENDED RELEASE ORAL DAILY
Qty: 90 TABLET | Refills: 1 | Status: SHIPPED | OUTPATIENT
Start: 2023-01-23

## 2023-01-23 NOTE — PROGRESS NOTES
Subjective     Asya Lambert is a 57 y.o. female.     History of Present Illness  Pt is here today for a 3 mo follow up on anxiety.  At her last visit her lexapro was increased to 20mg daily  She is also on buspar 10 mg bid and xanax 0.5mg prn.   She rarely needs the xanax.  Once every few weeks and only takes 1/2  Pt is also following up on hyperlipidemia.  She was unable to tolerate statin in the past.  She is now on zetia and takes fish oil most days  Denies CP, dizziness, HA.   Has occasional SOA           The following portions of the patient's history were reviewed and updated as appropriate: allergies, current medications, past family history, past medical history, past social history, past surgical history and problem list.    Review of Systems   Constitutional: Positive for diaphoresis. Negative for chills, fatigue and fever.   Respiratory: Positive for shortness of breath. Negative for chest tightness.    Cardiovascular: Negative for chest pain and palpitations.   Gastrointestinal: Negative for abdominal pain, nausea and vomiting.   Musculoskeletal: Positive for arthralgias.   Neurological: Negative for dizziness and headache.   Psychiatric/Behavioral: Negative for self-injury, suicidal ideas, depressed mood and stress. The patient is not nervous/anxious.        Objective     /85   Pulse 60   Temp 97.8 °F (36.6 °C) (Tympanic)   Wt 89.8 kg (198 lb)   SpO2 97%   BMI 30.11 kg/m²     Current Outpatient Medications on File Prior to Visit   Medication Sig Dispense Refill   • ALPRAZolam (XANAX) 0.5 MG tablet TAKE 1/2 TO 1 TABLET BY MOUTH DAILY AS NEEDED FOR ANXIETY     • Cholecalciferol 50 MCG (2000 UT) capsule      • Diclofenac Sodium (Pennsaid) 2 % solution Apply 2 Pump topically to the appropriate area as directed Daily As Needed.     • ezetimibe (ZETIA) 10 MG tablet TAKE 1 TABLET BY MOUTH DAILY. 90 tablet 0   • gabapentin (NEURONTIN) 100 MG capsule Take 1 capsule by mouth 3 (Three) Times a Day. 270  capsule 1   • hydroCHLOROthiazide (HYDRODIURIL) 12.5 MG tablet Take 1 tablet by mouth Daily. 90 tablet 1   • levonorgestrel (MIRENA) 20 MCG/24HR IUD 1 each by Intrauterine route.     • loratadine (CLARITIN) 10 MG tablet Take 10 mg by mouth Daily.     • omeprazole (priLOSEC) 20 MG capsule Take 20 mg by mouth Daily.     • [DISCONTINUED] busPIRone (BUSPAR) 10 MG tablet TAKE 1 TABLET BY MOUTH 2 TIMES A  tablet 0   • [DISCONTINUED] escitalopram (LEXAPRO) 20 MG tablet TAKE 1 TABLET BY MOUTH DAILY. 30 tablet 0   • [DISCONTINUED] metoprolol succinate XL (Toprol XL) 25 MG 24 hr tablet Take 1 tablet by mouth Daily. 90 tablet 1   • [DISCONTINUED] pravastatin (PRAVACHOL) 10 MG tablet Take 1 tablet by mouth Daily. 90 tablet 0     No current facility-administered medications on file prior to visit.        Physical Exam  Constitutional:       Appearance: Normal appearance. She is not ill-appearing.   HENT:      Head: Normocephalic and atraumatic.   Cardiovascular:      Rate and Rhythm: Normal rate and regular rhythm.      Heart sounds: No murmur heard.  Pulmonary:      Effort: Pulmonary effort is normal. No respiratory distress.      Breath sounds: Normal breath sounds.   Skin:     General: Skin is warm and dry.   Neurological:      General: No focal deficit present.      Mental Status: She is alert and oriented to person, place, and time.   Psychiatric:         Mood and Affect: Mood normal.         Behavior: Behavior normal.         Thought Content: Thought content normal.         Judgment: Judgment normal.           Assessment & Plan     Diagnoses and all orders for this visit:    1. Anxiety (Primary)  Comments:  stable  cont lexapro 20mg daily  cont buspar and xanax  Orders:  -     escitalopram (LEXAPRO) 20 MG tablet; Take 1 tablet by mouth Daily.  Dispense: 90 tablet; Refill: 1  -     busPIRone (BUSPAR) 10 MG tablet; Take 1 tablet by mouth 2 (Two) Times a Day.  Dispense: 180 tablet; Refill: 0    2. Hyperlipidemia,  unspecified hyperlipidemia type  Comments:  recheck level  continue to work on diet and exercise  may need to try statin again  Orders:  -     Lipid panel; Future    3. Palpitations  Comments:  improved  cont current dose of metoprolol  Orders:  -     metoprolol succinate XL (Toprol XL) 25 MG 24 hr tablet; Take 1 tablet by mouth Daily.  Dispense: 90 tablet; Refill: 1

## 2023-03-08 DIAGNOSIS — E78.5 HYPERLIPIDEMIA, UNSPECIFIED HYPERLIPIDEMIA TYPE: ICD-10-CM

## 2023-03-08 RX ORDER — EZETIMIBE 10 MG/1
10 TABLET ORAL DAILY
Qty: 90 TABLET | Refills: 0 | Status: SHIPPED | OUTPATIENT
Start: 2023-03-08

## 2023-03-23 DIAGNOSIS — G62.9 NEUROPATHY: ICD-10-CM

## 2023-03-23 RX ORDER — GABAPENTIN 100 MG/1
CAPSULE ORAL
Qty: 270 CAPSULE | Refills: 0 | Status: SHIPPED | OUTPATIENT
Start: 2023-03-23

## 2023-05-02 RX ORDER — HYDROCHLOROTHIAZIDE 12.5 MG/1
TABLET ORAL
Qty: 90 TABLET | Refills: 0 | Status: SHIPPED | OUTPATIENT
Start: 2023-05-02

## 2023-05-03 ENCOUNTER — ON CAMPUS - OUTPATIENT (AMBULATORY)
Dept: URBAN - METROPOLITAN AREA HOSPITAL 2 | Facility: HOSPITAL | Age: 58
End: 2023-05-03

## 2023-05-03 VITALS
DIASTOLIC BLOOD PRESSURE: 70 MMHG | DIASTOLIC BLOOD PRESSURE: 69 MMHG | SYSTOLIC BLOOD PRESSURE: 103 MMHG | HEIGHT: 70 IN | HEART RATE: 74 BPM | HEART RATE: 79 BPM | SYSTOLIC BLOOD PRESSURE: 105 MMHG | SYSTOLIC BLOOD PRESSURE: 100 MMHG | OXYGEN SATURATION: 98 % | RESPIRATION RATE: 18 BRPM | DIASTOLIC BLOOD PRESSURE: 94 MMHG | WEIGHT: 199 LBS | SYSTOLIC BLOOD PRESSURE: 119 MMHG | SYSTOLIC BLOOD PRESSURE: 151 MMHG | DIASTOLIC BLOOD PRESSURE: 89 MMHG | DIASTOLIC BLOOD PRESSURE: 81 MMHG | OXYGEN SATURATION: 96 % | OXYGEN SATURATION: 100 % | OXYGEN SATURATION: 99 % | HEART RATE: 59 BPM | DIASTOLIC BLOOD PRESSURE: 74 MMHG | TEMPERATURE: 97.5 F | HEART RATE: 63 BPM | HEART RATE: 80 BPM | HEART RATE: 77 BPM | HEART RATE: 78 BPM | DIASTOLIC BLOOD PRESSURE: 86 MMHG | SYSTOLIC BLOOD PRESSURE: 140 MMHG | RESPIRATION RATE: 16 BRPM

## 2023-05-03 DIAGNOSIS — K21.9 GASTRO-ESOPHAGEAL REFLUX DISEASE WITHOUT ESOPHAGITIS: ICD-10-CM

## 2023-05-03 DIAGNOSIS — R13.10 DYSPHAGIA, UNSPECIFIED: ICD-10-CM

## 2023-05-03 PROCEDURE — 43450 DILATE ESOPHAGUS 1/MULT PASS: CPT | Performed by: INTERNAL MEDICINE

## 2023-05-03 PROCEDURE — 43235 EGD DIAGNOSTIC BRUSH WASH: CPT | Performed by: INTERNAL MEDICINE

## 2023-05-03 RX ORDER — OMEPRAZOLE 40 MG/1
40 CAPSULE, DELAYED RELEASE ORAL
Qty: 90 | Refills: 3 | Status: ACTIVE
Start: 2023-05-03

## 2023-05-04 ENCOUNTER — OFFICE VISIT (OUTPATIENT)
Dept: FAMILY MEDICINE CLINIC | Facility: CLINIC | Age: 58
End: 2023-05-04
Payer: COMMERCIAL

## 2023-05-04 ENCOUNTER — TELEPHONE (OUTPATIENT)
Dept: FAMILY MEDICINE CLINIC | Facility: CLINIC | Age: 58
End: 2023-05-04

## 2023-05-04 VITALS
WEIGHT: 199.6 LBS | SYSTOLIC BLOOD PRESSURE: 123 MMHG | BODY MASS INDEX: 30.25 KG/M2 | TEMPERATURE: 97.8 F | DIASTOLIC BLOOD PRESSURE: 85 MMHG | OXYGEN SATURATION: 96 % | HEIGHT: 68 IN | HEART RATE: 67 BPM

## 2023-05-04 DIAGNOSIS — J01.00 ACUTE NON-RECURRENT MAXILLARY SINUSITIS: ICD-10-CM

## 2023-05-04 DIAGNOSIS — B37.31 YEAST VAGINITIS: ICD-10-CM

## 2023-05-04 DIAGNOSIS — J02.9 SORE THROAT: ICD-10-CM

## 2023-05-04 DIAGNOSIS — J06.9 UPPER RESPIRATORY TRACT INFECTION, UNSPECIFIED TYPE: Primary | ICD-10-CM

## 2023-05-04 LAB
EXPIRATION DATE: NORMAL
INTERNAL CONTROL: NORMAL
Lab: NORMAL
S PYO AG THROAT QL: NEGATIVE

## 2023-05-04 PROCEDURE — 99213 OFFICE O/P EST LOW 20 MIN: CPT

## 2023-05-04 PROCEDURE — 87880 STREP A ASSAY W/OPTIC: CPT

## 2023-05-04 RX ORDER — FLUCONAZOLE 150 MG/1
150 TABLET ORAL ONCE
Qty: 1 TABLET | Refills: 0 | Status: SHIPPED | OUTPATIENT
Start: 2023-05-04 | End: 2023-05-04

## 2023-05-04 RX ORDER — ESTRADIOL 0.05 MG/D
PATCH TRANSDERMAL
COMMUNITY
Start: 2023-04-18

## 2023-05-04 RX ORDER — OMEPRAZOLE 40 MG/1
40 CAPSULE, DELAYED RELEASE ORAL DAILY
COMMUNITY
Start: 2023-05-03

## 2023-05-04 RX ORDER — AZITHROMYCIN 250 MG/1
TABLET, FILM COATED ORAL
Qty: 6 TABLET | Refills: 0 | Status: SHIPPED | OUTPATIENT
Start: 2023-05-04

## 2023-05-04 RX ORDER — GABAPENTIN 100 MG/1
300 CAPSULE ORAL
COMMUNITY
Start: 2023-04-11 | End: 2023-05-04

## 2023-05-04 NOTE — PROGRESS NOTES
"Chief Complaint  Sore Throat, Fever, and Cough    Subjective        Asya Lambert presents to [facility]  History of Present Illness    Pt is here for sore throat, fever, congestion and cough. She started feeling bad on Tuesday. They watched her granddaughter over the weekend and she wasn't feeling well. She says it started with congestion. Her stomach is upset, and appetite is decreased. She had a fever of 101 last night. She has felt chilled. She denies vomiting, diarrhea, ear ache, tooth pain. She has had a headache off and on. She does report sinus pressure.    Objective   Vital Signs:  /85 (BP Location: Left arm, Patient Position: Sitting, Cuff Size: Adult)   Pulse 67   Temp 97.8 °F (36.6 °C) (Temporal)   Ht 172.7 cm (68\")   Wt 90.5 kg (199 lb 9.6 oz)   SpO2 96%   BMI 30.35 kg/m²   Estimated body mass index is 30.35 kg/m² as calculated from the following:    Height as of this encounter: 172.7 cm (68\").    Weight as of this encounter: 90.5 kg (199 lb 9.6 oz).          Review of Systems   Constitutional: Positive for chills, fatigue and fever. Negative for appetite change and diaphoresis.   HENT: Positive for congestion, postnasal drip, sinus pressure and sore throat. Negative for dental problem, ear discharge, ear pain, rhinorrhea, sneezing and tinnitus.    Respiratory: Negative for cough, shortness of breath and wheezing.    Cardiovascular: Negative for chest pain, palpitations and leg swelling.   Gastrointestinal: Positive for nausea. Negative for abdominal pain, diarrhea and vomiting.   Musculoskeletal: Negative for gait problem, joint swelling and myalgias.   Skin: Negative for rash and wound.   Neurological: Negative for dizziness, syncope, light-headedness and confusion.   Psychiatric/Behavioral: Negative for agitation, behavioral problems and decreased concentration. The patient is not nervous/anxious.         Physical Exam  Vitals reviewed.   Constitutional:  "      General: She is not in acute distress.     Appearance: Normal appearance. She is ill-appearing. She is not toxic-appearing or diaphoretic.   HENT:      Right Ear: Tympanic membrane, ear canal and external ear normal. There is no impacted cerumen.      Left Ear: Tympanic membrane, ear canal and external ear normal. There is no impacted cerumen.      Nose: Congestion present.      Mouth/Throat:      Pharynx: Posterior oropharyngeal erythema present. No oropharyngeal exudate.   Eyes:      Conjunctiva/sclera: Conjunctivae normal.      Pupils: Pupils are equal, round, and reactive to light.   Cardiovascular:      Rate and Rhythm: Normal rate and regular rhythm.      Pulses: Normal pulses.      Heart sounds: Normal heart sounds.   Pulmonary:      Effort: Pulmonary effort is normal. No respiratory distress.      Breath sounds: Normal breath sounds.   Musculoskeletal:      Cervical back: Normal range of motion. No tenderness.   Lymphadenopathy:      Cervical: Cervical adenopathy present.   Skin:     General: Skin is warm and dry.      Capillary Refill: Capillary refill takes less than 2 seconds.   Neurological:      General: No focal deficit present.      Mental Status: She is alert and oriented to person, place, and time.   Psychiatric:         Mood and Affect: Mood normal.         Behavior: Behavior normal.         Thought Content: Thought content normal.         Judgment: Judgment normal.        Result Review :                Assessment and Plan   Diagnoses and all orders for this visit:    1. Upper respiratory tract infection, unspecified type (Primary)  -     azithromycin (Zithromax Z-Celestino) 250 MG tablet; Take 2 tablets by mouth on day 1, then 1 tablet daily on days 2-5  Dispense: 6 tablet; Refill: 0    2. Acute non-recurrent maxillary sinusitis  -     azithromycin (Zithromax Z-Celestino) 250 MG tablet; Take 2 tablets by mouth on day 1, then 1 tablet daily on days 2-5  Dispense: 6 tablet; Refill: 0    3. Sore throat  -      POCT rapid strep A    4. Yeast vaginitis  -     fluconazole (Diflucan) 150 MG tablet; Take 1 tablet by mouth 1 (One) Time for 1 dose.  Dispense: 1 tablet; Refill: 0             Follow Up   Return if symptoms worsen or fail to improve.  Patient was given instructions and counseling regarding her condition or for health maintenance advice. Please see specific information pulled into the AVS if appropriate.

## 2023-05-04 NOTE — TELEPHONE ENCOUNTER
ALEJANDRO AT [facility] - [place], [place] - [address] - [phone] PH - [phone] FX  [phone] STATES THE azithromycin (Zithromax Z-Celestino) 250 MG tablet CAN INTERACT THE LEXAPRO AND THE FLUCONAZOLE AND     THEN THE FLUCONAZOLE CAN INTERACT WITH THE BUSPIRONE, AZITHROMYCIN, AND LEXAPRO    ALEJANDRO WANTED TO MAKE SURE E[facility] IS AWARE OF THESE POSSIBLE INTERACTIONS, PLEASE CALL BACK AND DISCUSS BEFORE THEY WILL BE FILLED.     [phone]

## 2023-05-25 NOTE — PROGRESS NOTES
Subjective     Asya Lambert is a 57 y.o. female.     History of Present Illness  Patient is here today for a [facility] follow-up admission.  Patient was admitted April 11 with complaints of chest pain  She had a substantial cardiac work-up   EKG and stress test were normal.  Troponins were negative.  CXR was normal.  Had a previous EGD that was normal.   She is on prilosec 40mg daily.  Patient is on hydrochlorothiazide 12.5 mg daily and metoprolol ER 25 mg daily.  She states she is doing well on the medication.  Pt states she struggles with anxiety.  She is on the lexapro 20mg daily and buspar 10mg bid.   She would like to try something else  Has been on celexa and Cymbalta previously.          The following portions of the patient's history were reviewed and updated as appropriate: allergies, current medications, past family history, past medical history, past social history, past surgical history and problem list.    Review of Systems   Constitutional: Negative for chills, fatigue and fever.   Respiratory: Negative for chest tightness and shortness of breath.    Cardiovascular: Negative for chest pain and palpitations.   Neurological: Negative for dizziness and headache.   Psychiatric/Behavioral: Positive for stress. Negative for self-injury and suicidal ideas. The patient is nervous/anxious.        Objective     /92   Pulse 59   Temp 97.7 °F (36.5 °C) (Oral)   Wt 91.2 kg (201 lb)   SpO2 97%   BMI 30.56 kg/m²     Current Outpatient Medications on File Prior to Visit   Medication Sig Dispense Refill   • ALPRAZolam (XANAX) 0.5 MG tablet TAKE 1/2 TO 1 TABLET BY MOUTH DAILY AS NEEDED FOR ANXIETY     • busPIRone (BUSPAR) 10 MG tablet Take 1 tablet by mouth 2 (Two) Times a Day. 180 tablet 0   • Cholecalciferol 50 MCG (2000 UT) capsule      • Diclofenac Sodium (Pennsaid) 2 % solution Apply 2 Pump topically to the appropriate area as directed Daily As Needed.     • escitalopram  (LEXAPRO) 20 MG tablet Take 1 tablet by mouth Daily. 90 tablet 1   • estradiol (CLIMARA) 0.05 MG/24HR patch      • ezetimibe (ZETIA) 10 MG tablet TAKE 1 TABLET BY MOUTH DAILY. 90 tablet 0   • gabapentin (NEURONTIN) 100 MG capsule TAKE 1 CAPSULE BY MOUTH THREE TIMES DAILY 270 capsule 0   • levonorgestrel (MIRENA) 20 MCG/24HR IUD 1 each by Intrauterine route.     • loratadine (CLARITIN) 10 MG tablet Take 1 tablet by mouth Daily.     • metoprolol succinate XL (Toprol XL) 25 MG 24 hr tablet Take 1 tablet by mouth Daily. 90 tablet 1   • omeprazole (priLOSEC) 40 MG capsule 1 capsule Daily.     • [DISCONTINUED] azithromycin (Zithromax Z-Celestino) 250 MG tablet Take 2 tablets by mouth on day 1, then 1 tablet daily on days 2-5 6 tablet 0   • [DISCONTINUED] hydroCHLOROthiazide (HYDRODIURIL) 12.5 MG tablet TAKE ONE TABLET BY MOUTH EVERY DAY 90 tablet 0     No current facility-administered medications on file prior to visit.        Physical Exam  Vitals reviewed.   Constitutional:       General: She is not in acute distress.     Appearance: Normal appearance. She is well-developed. She is not diaphoretic.   HENT:      Head: Normocephalic and atraumatic.   Eyes:      General:         Right eye: No discharge.         Left eye: No discharge.      Extraocular Movements: Extraocular movements intact.      Conjunctiva/sclera: Conjunctivae normal.   Cardiovascular:      Rate and Rhythm: Normal rate and regular rhythm.      Heart sounds: No murmur heard.  Pulmonary:      Effort: Pulmonary effort is normal. No respiratory distress.      Breath sounds: Normal breath sounds. No wheezing or rales.   Abdominal:      General: Bowel sounds are normal.      Palpations: Abdomen is soft.   Musculoskeletal:         General: Normal range of motion.      Cervical back: Normal range of motion.   Skin:     General: Skin is warm and dry.   Neurological:      General: No focal deficit present.      Mental Status: She is alert and oriented to person, place,  and time.   Psychiatric:         Mood and Affect: Mood normal.         Behavior: Behavior normal.         Thought Content: Thought content normal.         Judgment: Judgment normal.           Assessment & Plan     Diagnoses and all orders for this visit:    1. Chest pain, unspecified type (Primary)  Comments:  resolved  reviewed hospital notes and tests  was likely anxiety or reflux  no episodes since hospitalization    2. Anxiety  Comments:  deteriorated  will get Genesight and switch off lexapro according to that  f/u 6 wks      3. Hypertension, unspecified type  Comments:  deteriorated  inc hctz to 25mg daily  f/u 6 wks  Orders:  -     hydroCHLOROthiazide (HYDRODIURIL) 25 MG tablet; Take 1 tablet by mouth Daily.  Dispense: 30 tablet; Refill: 1

## 2023-05-26 ENCOUNTER — OFFICE VISIT (OUTPATIENT)
Dept: FAMILY MEDICINE CLINIC | Facility: CLINIC | Age: 58
End: 2023-05-26
Payer: COMMERCIAL

## 2023-05-26 VITALS
BODY MASS INDEX: 30.56 KG/M2 | HEART RATE: 59 BPM | SYSTOLIC BLOOD PRESSURE: 143 MMHG | TEMPERATURE: 97.7 F | OXYGEN SATURATION: 97 % | WEIGHT: 201 LBS | DIASTOLIC BLOOD PRESSURE: 92 MMHG

## 2023-05-26 DIAGNOSIS — F41.9 ANXIETY: ICD-10-CM

## 2023-05-26 DIAGNOSIS — R07.9 CHEST PAIN, UNSPECIFIED TYPE: Primary | ICD-10-CM

## 2023-05-26 DIAGNOSIS — I10 HYPERTENSION, UNSPECIFIED TYPE: ICD-10-CM

## 2023-05-26 RX ORDER — HYDROCHLOROTHIAZIDE 25 MG/1
25 TABLET ORAL DAILY
Qty: 30 TABLET | Refills: 1 | Status: SHIPPED | OUTPATIENT
Start: 2023-05-26

## 2023-05-26 NOTE — PATIENT INSTRUCTIONS
Work on diet and exercise  Increase Hctz to 25mg daily  Monitor BP  Will notify you of Genesight results

## 2023-05-31 DIAGNOSIS — F41.9 ANXIETY: ICD-10-CM

## 2023-05-31 RX ORDER — BUSPIRONE HYDROCHLORIDE 10 MG/1
TABLET ORAL
Qty: 180 TABLET | Refills: 0 | Status: SHIPPED | OUTPATIENT
Start: 2023-05-31

## 2023-06-05 ENCOUNTER — TELEPHONE (OUTPATIENT)
Dept: FAMILY MEDICINE CLINIC | Facility: CLINIC | Age: 58
End: 2023-06-05
Payer: COMMERCIAL

## 2023-06-05 DIAGNOSIS — F41.9 ANXIETY: Primary | ICD-10-CM

## 2023-06-05 RX ORDER — DESVENLAFAXINE SUCCINATE 50 MG/1
50 TABLET, EXTENDED RELEASE ORAL DAILY
Qty: 30 TABLET | Refills: 0 | Status: SHIPPED | OUTPATIENT
Start: 2023-06-05

## 2023-06-05 NOTE — TELEPHONE ENCOUNTER
Pls let pt know I received Sonalight test results. The buspar is in the use as directed category which is good. The lexapro is in the moderate gene-drug interaction, meaning it could or could not work. Two meds in the use as directed category are pristiq or viibryd. I would recommend we try one of these for the anxiety/depression. See if she has a preference.

## 2023-06-19 DIAGNOSIS — E78.5 HYPERLIPIDEMIA, UNSPECIFIED HYPERLIPIDEMIA TYPE: ICD-10-CM

## 2023-06-19 RX ORDER — EZETIMIBE 10 MG/1
10 TABLET ORAL DAILY
Qty: 90 TABLET | Refills: 0 | Status: SHIPPED | OUTPATIENT
Start: 2023-06-19

## 2023-07-26 ENCOUNTER — OFFICE VISIT (OUTPATIENT)
Dept: FAMILY MEDICINE CLINIC | Facility: CLINIC | Age: 58
End: 2023-07-26
Payer: COMMERCIAL

## 2023-07-26 VITALS
DIASTOLIC BLOOD PRESSURE: 83 MMHG | OXYGEN SATURATION: 96 % | BODY MASS INDEX: 30.71 KG/M2 | TEMPERATURE: 98.1 F | SYSTOLIC BLOOD PRESSURE: 130 MMHG | HEART RATE: 68 BPM | WEIGHT: 202 LBS

## 2023-07-26 DIAGNOSIS — G62.9 NEUROPATHY: ICD-10-CM

## 2023-07-26 DIAGNOSIS — I10 HYPERTENSION, UNSPECIFIED TYPE: Primary | ICD-10-CM

## 2023-07-26 DIAGNOSIS — Z78.0 POST-MENOPAUSE: ICD-10-CM

## 2023-07-26 DIAGNOSIS — E78.5 HYPERLIPIDEMIA, UNSPECIFIED HYPERLIPIDEMIA TYPE: ICD-10-CM

## 2023-07-26 DIAGNOSIS — R41.840 ATTENTION DEFICIT: ICD-10-CM

## 2023-07-26 DIAGNOSIS — Z12.31 BREAST CANCER SCREENING BY MAMMOGRAM: ICD-10-CM

## 2023-07-26 DIAGNOSIS — F41.9 ANXIETY: ICD-10-CM

## 2023-07-26 PROCEDURE — 99214 OFFICE O/P EST MOD 30 MIN: CPT | Performed by: NURSE PRACTITIONER

## 2023-07-26 RX ORDER — BUPROPION HYDROCHLORIDE 150 MG/1
150 TABLET ORAL DAILY
Qty: 30 TABLET | Refills: 0 | Status: SHIPPED | OUTPATIENT
Start: 2023-07-26

## 2023-07-26 RX ORDER — ESTRADIOL 1 MG/1
1 TABLET ORAL DAILY
COMMUNITY
Start: 2023-07-13

## 2023-07-26 RX ORDER — HYDROCHLOROTHIAZIDE 25 MG/1
25 TABLET ORAL DAILY
Qty: 90 TABLET | Refills: 1 | Status: SHIPPED | OUTPATIENT
Start: 2023-07-26

## 2023-07-26 NOTE — PROGRESS NOTES
Subjective     Asya Lambert is a 57 y.o. female.     History of Present Illness  Pt is here today for a 6 mo follow up on HTN, hyperlipidemia, anxiety, and neuropathy.     HTN- pt is currently on metoprolol XL 25mg daily and Hctz 25mg daily. She has seen cardiology. She states she is doing well on the medication. Denies CP, SOA, dizziness, HA.     Hyperlipidemia- pt is currently on zetia 10mg daily. She also takes fish oil  but forgets at times. She stays active regularly. She is eating a well balanced diet.     Anxiety- pt is currently on pristiq 50mg daily, buspar 10mg bid and xanax prn. She states her mood is stable. Rarely needs the xanax. Her family thinks she has ADHD. She doesn't want to take stimulant meds. She is completing her job well. Interested in wellbutrin.     Neuropathy- pt is currently on gabapentin 100mg tid. Doing well on the medication.     Labs- UTD  Pap smear- 05/2023  Mammogram-   DEXA-  Colonoscopy-    Vaccines:  Flu-  PNA-  Shingles-  Tdap-  Covid-19-    Dental exam-  Eye exam-       The following portions of the patient's history were reviewed and updated as appropriate: allergies, current medications, past family history, past medical history, past social history, past surgical history, and problem list.    Review of Systems   Constitutional:  Negative for chills, fatigue and fever.   Respiratory:  Negative for chest tightness and shortness of breath.    Cardiovascular:  Negative for chest pain and palpitations.   Gastrointestinal:  Negative for abdominal pain, nausea and vomiting.   Neurological:  Negative for dizziness and headache.   Psychiatric/Behavioral:  Negative for depressed mood and stress. The patient is not nervous/anxious.      Objective     /83 (BP Location: Left arm, Patient Position: Sitting, Cuff Size: Large Adult)   Pulse 68   Temp 98.1 °F (36.7 °C) (Oral)   Wt 91.6 kg (202 lb)   SpO2 96%   BMI 30.71 kg/m²     Current Outpatient Medications on File Prior to  Visit   Medication Sig Dispense Refill    estradiol (ESTRACE) 1 MG tablet Take 1 tablet by mouth Daily.      ALPRAZolam (XANAX) 0.5 MG tablet TAKE 1/2 TO 1 TABLET BY MOUTH DAILY AS NEEDED FOR ANXIETY      busPIRone (BUSPAR) 10 MG tablet TAKE 1 TABLET BY MOUTH 2 TIMES A  tablet 0    Cholecalciferol 50 MCG (2000 UT) capsule       desvenlafaxine (PRISTIQ) 50 MG 24 hr tablet TAKE 1 TABLET BY MOUTH DAILY 30 tablet 0    Diclofenac Sodium (Pennsaid) 2 % solution Apply 2 Pump topically to the appropriate area as directed Daily As Needed.      ezetimibe (ZETIA) 10 MG tablet TAKE 1 TABLET BY MOUTH DAILY. 90 tablet 0    gabapentin (NEURONTIN) 100 MG capsule TAKE 1 CAPSULE BY MOUTH THREE TIMES DAILY 270 capsule 0    levonorgestrel (MIRENA) 20 MCG/24HR IUD 1 each by Intrauterine route.      loratadine (CLARITIN) 10 MG tablet Take 1 tablet by mouth Daily.      metoprolol succinate XL (Toprol XL) 25 MG 24 hr tablet Take 1 tablet by mouth Daily. 90 tablet 1    omeprazole (priLOSEC) 40 MG capsule 1 capsule Daily.      [DISCONTINUED] estradiol (CLIMARA) 0.05 MG/24HR patch       [DISCONTINUED] hydroCHLOROthiazide (HYDRODIURIL) 25 MG tablet Take 1 tablet by mouth Daily. 30 tablet 1     No current facility-administered medications on file prior to visit.        BMI is >= 30 and <35. (Class 1 Obesity). The following options were offered after discussion;: exercise counseling/recommendations and nutrition counseling/recommendations       Physical Exam  Vitals reviewed.   Constitutional:       General: She is not in acute distress.     Appearance: Normal appearance. She is well-developed. She is not diaphoretic.   HENT:      Head: Normocephalic and atraumatic.   Eyes:      General:         Right eye: No discharge.         Left eye: No discharge.      Extraocular Movements: Extraocular movements intact.      Conjunctiva/sclera: Conjunctivae normal.   Cardiovascular:      Rate and Rhythm: Normal rate and regular rhythm.   Pulmonary:       Effort: Pulmonary effort is normal. No respiratory distress.      Breath sounds: Normal breath sounds. No wheezing or rales.   Abdominal:      General: Bowel sounds are normal.      Palpations: Abdomen is soft.   Musculoskeletal:         General: Normal range of motion.      Cervical back: Normal range of motion.   Skin:     General: Skin is warm and dry.   Neurological:      General: No focal deficit present.      Mental Status: She is alert and oriented to person, place, and time.   Psychiatric:         Mood and Affect: Mood normal.         Behavior: Behavior normal.         Thought Content: Thought content normal.         Judgment: Judgment normal.         Assessment & Plan     Diagnoses and all orders for this visit:    1. Hypertension, unspecified type (Primary)  Comments:  stable  cont metoprolol amd HCTZ  Orders:  -     hydroCHLOROthiazide (HYDRODIURIL) 25 MG tablet; Take 1 tablet by mouth Daily.  Dispense: 90 tablet; Refill: 1    2. Attention deficit  Comments:  new issue  will try wellbutrin   f/u 3 mo  doesnt want stimulant  Orders:  -     buPROPion XL (Wellbutrin XL) 150 MG 24 hr tablet; Take 1 tablet by mouth Daily.  Dispense: 30 tablet; Refill: 0    3. Breast cancer screening by mammogram  -     Mammo Screening Digital Tomosynthesis Bilateral With CAD; Future    4. Post-menopause  -     DEXA Bone Density Axial; Future    5. Anxiety  Comments:  stable  cont pristiq, buspar  xanax prn  denies SI or HI    6. Neuropathy  Comments:  stable  cont gabapentin 100mg TID    7. Hyperlipidemia, unspecified hyperlipidemia type  Comments:  stable  labs UTD  cont zetia and fish oil  work on diet and exercise

## 2023-08-01 DIAGNOSIS — F41.9 ANXIETY: ICD-10-CM

## 2023-08-01 RX ORDER — DESVENLAFAXINE SUCCINATE 50 MG/1
50 TABLET, EXTENDED RELEASE ORAL DAILY
Qty: 30 TABLET | Refills: 0 | Status: SHIPPED | OUTPATIENT
Start: 2023-08-01

## 2023-08-15 DIAGNOSIS — R00.2 PALPITATIONS: ICD-10-CM

## 2023-08-15 RX ORDER — METOPROLOL SUCCINATE 25 MG/1
25 TABLET, EXTENDED RELEASE ORAL DAILY
Qty: 90 TABLET | Refills: 0 | Status: SHIPPED | OUTPATIENT
Start: 2023-08-15

## 2023-08-21 ENCOUNTER — HOSPITAL ENCOUNTER (OUTPATIENT)
Dept: BONE DENSITY | Facility: HOSPITAL | Age: 58
Discharge: HOME | End: 2023-08-21
Admitting: NURSE PRACTITIONER
Payer: COMMERCIAL

## 2023-08-21 DIAGNOSIS — Z78.0 POST-MENOPAUSE: ICD-10-CM

## 2023-08-21 PROCEDURE — 77080 DXA BONE DENSITY AXIAL: CPT

## 2023-08-23 ENCOUNTER — HOSPITAL ENCOUNTER (EMERGENCY)
Facility: HOSPITAL | Age: 58
Discharge: HOME | End: 2023-08-23
Attending: EMERGENCY MEDICINE
Payer: COMMERCIAL

## 2023-08-23 VITALS
SYSTOLIC BLOOD PRESSURE: 129 MMHG | OXYGEN SATURATION: 96 % | RESPIRATION RATE: 17 BRPM | WEIGHT: 203.04 LBS | BODY MASS INDEX: 29.07 KG/M2 | TEMPERATURE: 98.2 F | DIASTOLIC BLOOD PRESSURE: 73 MMHG | HEIGHT: 70 IN | HEART RATE: 63 BPM

## 2023-08-23 DIAGNOSIS — L50.9 URTICARIA: Primary | ICD-10-CM

## 2023-08-23 PROCEDURE — 96374 THER/PROPH/DIAG INJ IV PUSH: CPT

## 2023-08-23 PROCEDURE — 25010000002 METHYLPREDNISOLONE PER 125 MG: Performed by: EMERGENCY MEDICINE

## 2023-08-23 PROCEDURE — 96375 TX/PRO/DX INJ NEW DRUG ADDON: CPT

## 2023-08-23 PROCEDURE — 25010000002 DIPHENHYDRAMINE PER 50 MG: Performed by: EMERGENCY MEDICINE

## 2023-08-23 PROCEDURE — 99283 EMERGENCY DEPT VISIT LOW MDM: CPT

## 2023-08-23 RX ORDER — DIPHENHYDRAMINE HYDROCHLORIDE 50 MG/ML
25 INJECTION INTRAMUSCULAR; INTRAVENOUS ONCE
Status: COMPLETED | OUTPATIENT
Start: 2023-08-23 | End: 2023-08-23

## 2023-08-23 RX ORDER — FAMOTIDINE 10 MG/ML
20 INJECTION, SOLUTION INTRAVENOUS ONCE
Status: COMPLETED | OUTPATIENT
Start: 2023-08-23 | End: 2023-08-23

## 2023-08-23 RX ORDER — METHYLPREDNISOLONE SODIUM SUCCINATE 125 MG/2ML
125 INJECTION, POWDER, LYOPHILIZED, FOR SOLUTION INTRAMUSCULAR; INTRAVENOUS ONCE
Status: COMPLETED | OUTPATIENT
Start: 2023-08-23 | End: 2023-08-23

## 2023-08-23 RX ADMIN — FAMOTIDINE 20 MG: 10 INJECTION INTRAVENOUS at 00:49

## 2023-08-23 RX ADMIN — METHYLPREDNISOLONE SODIUM SUCCINATE 125 MG: 125 INJECTION, POWDER, FOR SOLUTION INTRAMUSCULAR; INTRAVENOUS at 00:49

## 2023-08-23 RX ADMIN — DIPHENHYDRAMINE HYDROCHLORIDE 25 MG: 50 INJECTION, SOLUTION INTRAMUSCULAR; INTRAVENOUS at 00:49

## 2023-08-23 NOTE — ED PROVIDER NOTES
Subjective   History of Present Illness  57-year-old female with urticarial rash on trunk onset .  Patient denies any history of dermatological conditions and denies any new medicines or any change in routine to explain the rash.  Patient was seen yesterday and given a Medrol Dosepak which she is taken 1 dose as well as triamcinolone ointment.  Patient denies any oral swelling or shortness of air or fever or arthralgias.    Review of Systems   Constitutional: Negative.    HENT: Negative.     Respiratory: Negative.     Musculoskeletal: Negative.    Skin:  Positive for rash.     Past Medical History:   Diagnosis Date    Anxiety     Arthritis     Fibromyalgia, primary     GERD (gastroesophageal reflux disease)     Hyperlipidemia     Hypertension     Neuropathy        Allergies   Allergen Reactions    Sulfa Antibiotics Other (See Comments)     LIPS SWELLING  Other reaction(s): Other (See Comments)  LIPS SWELLING    Sulfamethoxazole-Trimethoprim Anaphylaxis    Meperidine Nausea And Vomiting    Moxifloxacin Nausea And Vomiting     Emesis        Past Surgical History:   Procedure Laterality Date    APPENDECTOMY      BILATERAL BREAST REDUCTION      CERVICAL FUSION       SECTION      SINUS SURGERY      TUBAL ABDOMINAL LIGATION      WRIST SURGERY      left       Family History   Problem Relation Age of Onset    COPD Mother     Cancer Father         melanoma    Arthritis Father     Cancer Sister         uterine    Alzheimer's disease Maternal Grandmother     Heart disease Maternal Grandfather     Diabetes Paternal Grandmother     Cancer Sister        Social History     Socioeconomic History    Marital status:    Tobacco Use    Smoking status: Never     Passive exposure: Never    Smokeless tobacco: Never   Vaping Use    Vaping Use: Never used   Substance and Sexual Activity    Alcohol use: Yes     Comment: 4-6 drinks weekly    Drug use: Never    Sexual activity: Yes     Partners: Male     Birth  control/protection: I.U.D.           Objective   Physical Exam  Constitutional:       Appearance: Normal appearance.   HENT:      Head: Normocephalic and atraumatic.      Mouth/Throat:      Mouth: Mucous membranes are moist.      Pharynx: Oropharynx is clear.   Cardiovascular:      Rate and Rhythm: Normal rate and regular rhythm.      Heart sounds: Normal heart sounds.   Pulmonary:      Effort: Pulmonary effort is normal.      Breath sounds: Normal breath sounds.   Musculoskeletal:         General: Normal range of motion.   Skin:     Capillary Refill: Capillary refill takes less than 2 seconds.      Comments: Mild blanchable pruritic urticarial rash on trunk, none seen on extremities   Neurological:      Mental Status: She is alert and oriented to person, place, and time.   Psychiatric:         Mood and Affect: Mood normal.         Behavior: Behavior normal.       Procedures           ED Course                                           Medical Decision Making  Rash better, rec continue steroids, otc benadryl, return precautions reviewed.    Risk  Prescription drug management.        Final diagnoses:   Urticaria       ED Disposition  ED Disposition       ED Disposition   Discharge    Condition   Stable    Comment   --               Jes Juan, APRN  [address] [facility] [address]  [place] [address]  [phone]               Medication List      No changes were made to your prescriptions during this visit.            Marko Toscano MD  08/23/23 2218

## 2023-08-24 DIAGNOSIS — R41.840 ATTENTION DEFICIT: ICD-10-CM

## 2023-08-24 RX ORDER — BUPROPION HYDROCHLORIDE 150 MG/1
150 TABLET ORAL DAILY
Qty: 30 TABLET | Refills: 0 | Status: SHIPPED | OUTPATIENT
Start: 2023-08-24

## 2023-08-30 DIAGNOSIS — F41.9 ANXIETY: ICD-10-CM

## 2023-08-30 RX ORDER — DESVENLAFAXINE SUCCINATE 50 MG/1
50 TABLET, EXTENDED RELEASE ORAL DAILY
Qty: 30 TABLET | Refills: 0 | Status: SHIPPED | OUTPATIENT
Start: 2023-08-30

## 2023-09-06 DIAGNOSIS — F41.9 ANXIETY: ICD-10-CM

## 2023-09-06 RX ORDER — BUSPIRONE HYDROCHLORIDE 10 MG/1
TABLET ORAL
Qty: 180 TABLET | Refills: 0 | Status: SHIPPED | OUTPATIENT
Start: 2023-09-06

## 2023-09-12 DIAGNOSIS — J30.2 SEASONAL ALLERGIC RHINITIS, UNSPECIFIED TRIGGER: ICD-10-CM

## 2023-09-12 DIAGNOSIS — L50.9 HIVES: Primary | ICD-10-CM

## 2023-09-21 DIAGNOSIS — R41.840 ATTENTION DEFICIT: ICD-10-CM

## 2023-09-21 DIAGNOSIS — E78.5 HYPERLIPIDEMIA, UNSPECIFIED HYPERLIPIDEMIA TYPE: ICD-10-CM

## 2023-09-21 RX ORDER — EZETIMIBE 10 MG/1
10 TABLET ORAL DAILY
Qty: 90 TABLET | Refills: 0 | Status: SHIPPED | OUTPATIENT
Start: 2023-09-21

## 2023-09-21 RX ORDER — BUPROPION HYDROCHLORIDE 150 MG/1
150 TABLET ORAL DAILY
Qty: 30 TABLET | Refills: 0 | Status: SHIPPED | OUTPATIENT
Start: 2023-09-21

## 2023-10-03 DIAGNOSIS — F41.9 ANXIETY: ICD-10-CM

## 2023-10-03 DIAGNOSIS — G62.9 NEUROPATHY: ICD-10-CM

## 2023-10-03 RX ORDER — DESVENLAFAXINE SUCCINATE 50 MG/1
50 TABLET, EXTENDED RELEASE ORAL DAILY
Qty: 30 TABLET | Refills: 0 | Status: SHIPPED | OUTPATIENT
Start: 2023-10-03

## 2023-10-03 RX ORDER — GABAPENTIN 100 MG/1
CAPSULE ORAL
Qty: 270 CAPSULE | Refills: 0 | Status: SHIPPED | OUTPATIENT
Start: 2023-10-03

## 2023-10-25 NOTE — PROGRESS NOTES
Subjective     Asya Lambert is a 58 y.o. female.     History of Present Illness  Patient is here today for a 3-month follow-up on attention deficit.  Patient was started on Wellbutrin  mg 3 months ago in hopes of helping with concentration issues.  Patient reports that she feels like it has helped with her attention  She likes it in combo with the pristiq   Denies SI or HI.   Concentration has improved.     Pt states she was diagnosed with alpha gal.   She was getting hives  She states she can no longer eat beef. She hasn't reacted to pork and ham like the beef.        The following portions of the patient's history were reviewed and updated as appropriate: allergies, current medications, past family history, past medical history, past social history, past surgical history, and problem list.    Review of Systems   Constitutional:  Negative for chills, fatigue and fever.   Respiratory:  Negative for chest tightness and shortness of breath.    Cardiovascular:  Negative for chest pain and palpitations.   Gastrointestinal:  Negative for abdominal pain, nausea and vomiting.   Neurological:  Negative for dizziness and headache.   Psychiatric/Behavioral:  Negative for depressed mood and stress. The patient is not nervous/anxious.        Objective     /86 (BP Location: Left arm, Patient Position: Sitting, Cuff Size: Large Adult)   Pulse 60   Temp 98.1 °F (36.7 °C) (Oral)   Wt 93.9 kg (207 lb)   LMP 11/22/2019   SpO2 95%   BMI 29.70 kg/m²     Current Outpatient Medications on File Prior to Visit   Medication Sig Dispense Refill    ezetimibe (ZETIA) 10 MG tablet TAKE 1 TABLET BY MOUTH DAILY 90 tablet 0    gabapentin (NEURONTIN) 100 MG capsule TAKE 1 CAPSULE BY MOUTH THREE TIMES DAILY 270 capsule 0    ALPRAZolam (XANAX) 0.5 MG tablet TAKE 1/2 TO 1 TABLET BY MOUTH DAILY AS NEEDED FOR ANXIETY      busPIRone (BUSPAR) 10 MG tablet TAKE 1 TABLET BY MOUTH 2 TIMES A  tablet 0    Cholecalciferol 50 MCG (2000  UT) capsule       Diclofenac Sodium (Pennsaid) 2 % solution Apply 2 Pump topically to the appropriate area as directed Daily As Needed.      estradiol (ESTRACE) 1 MG tablet Take 1 tablet by mouth Daily.      hydroCHLOROthiazide (HYDRODIURIL) 25 MG tablet Take 1 tablet by mouth Daily. 90 tablet 1    levonorgestrel (MIRENA) 20 MCG/24HR IUD 1 each by Intrauterine route.      loratadine (CLARITIN) 10 MG tablet Take 1 tablet by mouth Daily.      metoprolol succinate XL (TOPROL-XL) 25 MG 24 hr tablet TAKE 1 TABLET BY MOUTH DAILY. 90 tablet 0    omeprazole (priLOSEC) 40 MG capsule 1 capsule Daily.      [DISCONTINUED] buPROPion XL (WELLBUTRIN XL) 150 MG 24 hr tablet TAKE 1 TABLET BY MOUTH DAILY. 30 tablet 0    [DISCONTINUED] desvenlafaxine (PRISTIQ) 50 MG 24 hr tablet TAKE 1 TABLET BY MOUTH DAILY 30 tablet 0     No current facility-administered medications on file prior to visit.                 Physical Exam  Constitutional:       General: She is not in acute distress.     Appearance: Normal appearance. She is not ill-appearing.   HENT:      Head: Normocephalic and atraumatic.   Cardiovascular:      Rate and Rhythm: Normal rate and regular rhythm.      Heart sounds: No murmur heard.  Pulmonary:      Effort: Pulmonary effort is normal. No respiratory distress.      Breath sounds: Normal breath sounds.   Skin:     General: Skin is warm and dry.   Neurological:      General: No focal deficit present.      Mental Status: She is alert and oriented to person, place, and time.   Psychiatric:         Mood and Affect: Mood normal.         Behavior: Behavior normal.         Thought Content: Thought content normal.         Judgment: Judgment normal.           Assessment & Plan     Diagnoses and all orders for this visit:    1. Attention deficit (Primary)  Comments:  some improvement  cont wellbutrin  avoid stimulant d/t history of palpitations  Orders:  -     buPROPion XL (WELLBUTRIN XL) 150 MG 24 hr tablet; Take 1 tablet by mouth  Daily.  Dispense: 90 tablet; Refill: 1    2. Anxiety  Comments:  stable  cont pristiq  Orders:  -     desvenlafaxine (PRISTIQ) 50 MG 24 hr tablet; Take 1 tablet by mouth Daily.  Dispense: 90 tablet; Refill: 1

## 2023-10-26 ENCOUNTER — OFFICE VISIT (OUTPATIENT)
Dept: FAMILY MEDICINE CLINIC | Facility: CLINIC | Age: 58
End: 2023-10-26
Payer: COMMERCIAL

## 2023-10-26 VITALS
SYSTOLIC BLOOD PRESSURE: 130 MMHG | WEIGHT: 207 LBS | TEMPERATURE: 98.1 F | OXYGEN SATURATION: 95 % | HEART RATE: 60 BPM | DIASTOLIC BLOOD PRESSURE: 86 MMHG | BODY MASS INDEX: 29.7 KG/M2

## 2023-10-26 DIAGNOSIS — R41.840 ATTENTION DEFICIT: Primary | ICD-10-CM

## 2023-10-26 DIAGNOSIS — F41.9 ANXIETY: ICD-10-CM

## 2023-10-26 PROCEDURE — 99213 OFFICE O/P EST LOW 20 MIN: CPT | Performed by: NURSE PRACTITIONER

## 2023-10-26 RX ORDER — BUPROPION HYDROCHLORIDE 150 MG/1
150 TABLET ORAL DAILY
Qty: 90 TABLET | Refills: 1 | Status: SHIPPED | OUTPATIENT
Start: 2023-10-26

## 2023-10-26 RX ORDER — DESVENLAFAXINE SUCCINATE 50 MG/1
50 TABLET, EXTENDED RELEASE ORAL DAILY
Qty: 90 TABLET | Refills: 1 | Status: SHIPPED | OUTPATIENT
Start: 2023-10-26

## 2023-11-20 DIAGNOSIS — R00.2 PALPITATIONS: ICD-10-CM

## 2023-11-20 RX ORDER — METOPROLOL SUCCINATE 25 MG/1
25 TABLET, EXTENDED RELEASE ORAL DAILY
Qty: 90 TABLET | Refills: 0 | Status: SHIPPED | OUTPATIENT
Start: 2023-11-20

## 2023-11-29 DIAGNOSIS — E66.3 OVERWEIGHT (BMI 25.0-29.9): Primary | ICD-10-CM

## 2023-11-29 DIAGNOSIS — E78.5 HYPERLIPIDEMIA, UNSPECIFIED HYPERLIPIDEMIA TYPE: ICD-10-CM

## 2023-11-30 ENCOUNTER — TELEPHONE (OUTPATIENT)
Dept: FAMILY MEDICINE CLINIC | Facility: CLINIC | Age: 58
End: 2023-11-30
Payer: COMMERCIAL

## 2023-11-30 NOTE — TELEPHONE ENCOUNTER
Prior Auth completed for Wegovy 0.25MG/0.5ML auto-injectors via covermymeds. Pending determination.  (Key: [id number])  Rx #: [id number]

## 2023-11-30 NOTE — TELEPHONE ENCOUNTER
Prior Authorization for  Wegovy 0.25MG/0.5ML auto-injectors approved.       Message from plan: Request Reference Number: [id number]. WEGOVY INJ 0.25MG is approved through 06/30/2024. Your patient may now fill this prescription and it will be covered.      Faxed approval to pharmacy.

## 2023-12-18 DIAGNOSIS — F41.9 ANXIETY: ICD-10-CM

## 2023-12-18 RX ORDER — BUSPIRONE HYDROCHLORIDE 10 MG/1
TABLET ORAL
Qty: 180 TABLET | Refills: 0 | Status: SHIPPED | OUTPATIENT
Start: 2023-12-18

## 2023-12-27 DIAGNOSIS — E78.5 HYPERLIPIDEMIA, UNSPECIFIED HYPERLIPIDEMIA TYPE: ICD-10-CM

## 2023-12-27 RX ORDER — EZETIMIBE 10 MG/1
10 TABLET ORAL DAILY
Qty: 90 TABLET | Refills: 0 | Status: SHIPPED | OUTPATIENT
Start: 2023-12-27

## 2024-01-04 ENCOUNTER — TELEPHONE (OUTPATIENT)
Dept: FAMILY MEDICINE CLINIC | Facility: CLINIC | Age: 59
End: 2024-01-04
Payer: COMMERCIAL

## 2024-01-04 DIAGNOSIS — G62.9 NEUROPATHY: ICD-10-CM

## 2024-01-04 RX ORDER — GABAPENTIN 100 MG/1
CAPSULE ORAL
Qty: 270 CAPSULE | Refills: 0 | Status: SHIPPED | OUTPATIENT
Start: 2024-01-04

## 2024-01-04 NOTE — TELEPHONE ENCOUNTER
Prior Auth completed for Desvenlafaxine Succinate ER 50MG er tablets via covermymeds. Pending determination.    (Key: [id number])  PA Case ID #: [id number]  Rx #: [id number]    Form  [employer] Electronic PA Form (2017 NCPDP)

## 2024-01-05 NOTE — TELEPHONE ENCOUNTER
Prior Authorization for  Desvenlafaxine Succinate ER 50MG er tablets approved.     From 12/6/2023-01/04/2025  Case id [id number]          Faxed approval to pharmacy.

## 2024-01-24 DIAGNOSIS — I10 HYPERTENSION, UNSPECIFIED TYPE: ICD-10-CM

## 2024-01-24 RX ORDER — HYDROCHLOROTHIAZIDE 25 MG/1
25 TABLET ORAL DAILY
Qty: 90 TABLET | Refills: 0 | Status: SHIPPED | OUTPATIENT
Start: 2024-01-24

## 2024-02-20 DIAGNOSIS — R00.2 PALPITATIONS: ICD-10-CM

## 2024-02-20 RX ORDER — METOPROLOL SUCCINATE 25 MG/1
25 TABLET, EXTENDED RELEASE ORAL DAILY
Qty: 90 TABLET | Refills: 0 | Status: SHIPPED | OUTPATIENT
Start: 2024-02-20

## 2024-03-11 ENCOUNTER — LAB (OUTPATIENT)
Dept: LAB | Facility: HOSPITAL | Age: 59
End: 2024-03-11
Payer: COMMERCIAL

## 2024-03-11 ENCOUNTER — TRANSCRIBE ORDERS (OUTPATIENT)
Dept: ADMINISTRATIVE | Facility: HOSPITAL | Age: 59
End: 2024-03-11
Payer: COMMERCIAL

## 2024-03-11 DIAGNOSIS — J30.1 ALLERGIC RHINITIS DUE TO POLLEN, UNSPECIFIED SEASONALITY: Primary | ICD-10-CM

## 2024-03-11 DIAGNOSIS — J30.1 ALLERGIC RHINITIS DUE TO POLLEN, UNSPECIFIED SEASONALITY: ICD-10-CM

## 2024-03-11 DIAGNOSIS — J30.89 PERENNIAL ALLERGIC RHINITIS: ICD-10-CM

## 2024-03-11 DIAGNOSIS — L50.9 URTICARIA, UNSPECIFIED: ICD-10-CM

## 2024-03-11 PROCEDURE — 82785 ASSAY OF IGE: CPT

## 2024-03-11 PROCEDURE — 36415 COLL VENOUS BLD VENIPUNCTURE: CPT

## 2024-03-11 PROCEDURE — 86003 ALLG SPEC IGE CRUDE XTRC EA: CPT

## 2024-03-11 PROCEDURE — 86008 ALLG SPEC IGE RECOMB EA: CPT

## 2024-03-16 LAB
ALPHA-GAL IGE QN: 0.32 KU/L
BEEF IGE QN: 0.11 KU/L
CONV CLASS DESCRIPTION: ABNORMAL
IGE SERPL-ACNC: 34 IU/ML (ref 6–495)
LAMB IGE QN: <0.1 KU/L
MUSHROOM IGE QN: <0.1 KU/L
PORK IGE QN: <0.1 KU/L

## 2024-03-26 ENCOUNTER — OFFICE VISIT (OUTPATIENT)
Dept: FAMILY MEDICINE CLINIC | Facility: CLINIC | Age: 59
End: 2024-03-26
Payer: COMMERCIAL

## 2024-03-26 VITALS
SYSTOLIC BLOOD PRESSURE: 129 MMHG | DIASTOLIC BLOOD PRESSURE: 81 MMHG | HEIGHT: 70 IN | TEMPERATURE: 98.4 F | OXYGEN SATURATION: 98 % | BODY MASS INDEX: 30.21 KG/M2 | WEIGHT: 211 LBS | HEART RATE: 66 BPM

## 2024-03-26 DIAGNOSIS — J02.9 ACUTE PHARYNGITIS, UNSPECIFIED ETIOLOGY: Primary | ICD-10-CM

## 2024-03-26 DIAGNOSIS — J02.9 SORE THROAT: ICD-10-CM

## 2024-03-26 LAB
EXPIRATION DATE: NORMAL
INTERNAL CONTROL: NORMAL
Lab: NORMAL
S PYO AG THROAT QL: NEGATIVE

## 2024-03-26 PROCEDURE — 87880 STREP A ASSAY W/OPTIC: CPT | Performed by: NURSE PRACTITIONER

## 2024-03-26 PROCEDURE — 99213 OFFICE O/P EST LOW 20 MIN: CPT | Performed by: NURSE PRACTITIONER

## 2024-03-26 RX ORDER — AZITHROMYCIN 250 MG/1
TABLET, FILM COATED ORAL
Qty: 6 TABLET | Refills: 0 | Status: SHIPPED | OUTPATIENT
Start: 2024-03-26

## 2024-03-26 NOTE — PROGRESS NOTES
Subjective   Asya Lambert is a 58 y.o. female.       HPI   Pt is here today with concern of a sore throat.  RST negative today.     Symptoms started last week.  Throat has felt sore, raw, painful to swallow.  Not able to eat much due to pain.  Doing warm salt water gargles.  Takes Zyrtec and Nasacort daily.  Not feeling drainage in throat.  Denies any ear pain, sinus congestion.    No fevers.      The following portions of the patient's history were reviewed and updated as appropriate: allergies, current medications, past family history, past medical history, past social history, past surgical history, and problem list.    Review of Systems   Constitutional:  Negative for chills, fatigue and fever.   HENT:  Positive for sore throat and trouble swallowing. Negative for congestion, ear pain, postnasal drip, rhinorrhea, sinus pressure, sneezing and swollen glands.    Respiratory:  Negative for cough, shortness of breath and wheezing.    Cardiovascular:  Negative for chest pain and palpitations.   Gastrointestinal:  Negative for nausea and vomiting.   Neurological:  Negative for dizziness, weakness and headache.   Psychiatric/Behavioral:  Negative for depressed mood. The patient is not nervous/anxious.        Objective   Physical Exam  Vitals reviewed.   Constitutional:       General: She is not in acute distress.     Appearance: Normal appearance.   HENT:      Head: Normocephalic and atraumatic.      Right Ear: Hearing, tympanic membrane, ear canal and external ear normal.      Left Ear: Hearing, tympanic membrane, ear canal and external ear normal.      Nose: Nose normal.      Right Sinus: No maxillary sinus tenderness or frontal sinus tenderness.      Left Sinus: No maxillary sinus tenderness or frontal sinus tenderness.      Mouth/Throat:      Lips: Pink.      Mouth: Mucous membranes are moist.      Pharynx: Posterior oropharyngeal erythema present. No pharyngeal swelling, oropharyngeal exudate or uvula swelling.    Eyes:      General:         Right eye: No discharge.         Left eye: No discharge.      Conjunctiva/sclera: Conjunctivae normal.   Cardiovascular:      Rate and Rhythm: Normal rate and regular rhythm.      Pulses: Normal pulses.      Heart sounds: Normal heart sounds. No murmur heard.  Pulmonary:      Effort: Pulmonary effort is normal. No respiratory distress.      Breath sounds: Normal breath sounds. No wheezing or rhonchi.   Chest:      Chest wall: No tenderness.   Musculoskeletal:      Cervical back: Normal range of motion and neck supple. No tenderness.   Neurological:      General: No focal deficit present.      Mental Status: She is alert and oriented to person, place, and time.   Psychiatric:         Mood and Affect: Mood normal.                  Procedures   Assessment & Plan   Diagnoses and all orders for this visit:    1. Acute pharyngitis, unspecified etiology (Primary)  Comments:  Given Zpak.   Tylenol or ibuprofen PRN.   Throat spray or lozenge as needed.   Cont. warm salt water gargles.   Call for worsening.  Orders:  -     azithromycin (Zithromax Z-Celestino) 250 MG tablet; Take 2 tablets by mouth on day 1, then 1 tablet daily on days 2-5  Dispense: 6 tablet; Refill: 0    2. Sore throat  Comments:  RST is negative  Orders:  -     POC Rapid Strep A

## 2024-03-27 DIAGNOSIS — E78.5 HYPERLIPIDEMIA, UNSPECIFIED HYPERLIPIDEMIA TYPE: ICD-10-CM

## 2024-03-27 DIAGNOSIS — F41.9 ANXIETY: ICD-10-CM

## 2024-03-27 RX ORDER — EZETIMIBE 10 MG/1
10 TABLET ORAL DAILY
Qty: 90 TABLET | Refills: 0 | Status: SHIPPED | OUTPATIENT
Start: 2024-03-27

## 2024-03-27 RX ORDER — BUSPIRONE HYDROCHLORIDE 10 MG/1
TABLET ORAL
Qty: 180 TABLET | Refills: 0 | Status: SHIPPED | OUTPATIENT
Start: 2024-03-27

## 2024-04-04 DIAGNOSIS — G62.9 NEUROPATHY: ICD-10-CM

## 2024-04-04 RX ORDER — GABAPENTIN 100 MG/1
CAPSULE ORAL
Qty: 270 CAPSULE | Refills: 0 | Status: SHIPPED | OUTPATIENT
Start: 2024-04-04

## 2024-04-26 DIAGNOSIS — I10 HYPERTENSION, UNSPECIFIED TYPE: ICD-10-CM

## 2024-04-26 DIAGNOSIS — R41.840 ATTENTION DEFICIT: ICD-10-CM

## 2024-04-26 RX ORDER — HYDROCHLOROTHIAZIDE 25 MG/1
25 TABLET ORAL DAILY
Qty: 90 TABLET | Refills: 0 | Status: SHIPPED | OUTPATIENT
Start: 2024-04-26

## 2024-04-26 RX ORDER — BUPROPION HYDROCHLORIDE 150 MG/1
150 TABLET ORAL DAILY
Qty: 90 TABLET | Refills: 0 | Status: SHIPPED | OUTPATIENT
Start: 2024-04-26

## 2024-05-09 DIAGNOSIS — F41.9 ANXIETY: ICD-10-CM

## 2024-05-09 RX ORDER — OMEPRAZOLE 40 MG/1
40 CAPSULE, DELAYED RELEASE ORAL DAILY
Qty: 90 CAPSULE | Refills: 1 | Status: SHIPPED | OUTPATIENT
Start: 2024-05-09

## 2024-05-09 RX ORDER — DESVENLAFAXINE SUCCINATE 50 MG/1
50 TABLET, EXTENDED RELEASE ORAL DAILY
Qty: 90 TABLET | Refills: 0 | Status: SHIPPED | OUTPATIENT
Start: 2024-05-09

## 2024-05-22 DIAGNOSIS — R00.2 PALPITATIONS: ICD-10-CM

## 2024-05-22 RX ORDER — METOPROLOL SUCCINATE 25 MG/1
25 TABLET, EXTENDED RELEASE ORAL DAILY
Qty: 90 TABLET | Refills: 0 | Status: SHIPPED | OUTPATIENT
Start: 2024-05-22

## 2024-06-25 DIAGNOSIS — E78.5 HYPERLIPIDEMIA, UNSPECIFIED HYPERLIPIDEMIA TYPE: ICD-10-CM

## 2024-06-25 RX ORDER — EZETIMIBE 10 MG/1
10 TABLET ORAL DAILY
Qty: 90 TABLET | Refills: 1 | Status: SHIPPED | OUTPATIENT
Start: 2024-06-25

## 2024-07-01 DIAGNOSIS — F41.9 ANXIETY: ICD-10-CM

## 2024-07-01 RX ORDER — BUSPIRONE HYDROCHLORIDE 10 MG/1
TABLET ORAL
Qty: 180 TABLET | Refills: 0 | Status: SHIPPED | OUTPATIENT
Start: 2024-07-01

## 2024-07-05 DIAGNOSIS — G62.9 NEUROPATHY: ICD-10-CM

## 2024-07-05 RX ORDER — GABAPENTIN 100 MG/1
CAPSULE ORAL
Qty: 270 CAPSULE | Refills: 0 | Status: SHIPPED | OUTPATIENT
Start: 2024-07-05

## 2024-07-29 DIAGNOSIS — R41.840 ATTENTION DEFICIT: ICD-10-CM

## 2024-07-29 DIAGNOSIS — I10 HYPERTENSION, UNSPECIFIED TYPE: ICD-10-CM

## 2024-07-29 RX ORDER — HYDROCHLOROTHIAZIDE 25 MG/1
25 TABLET ORAL DAILY
Qty: 90 TABLET | Refills: 0 | Status: SHIPPED | OUTPATIENT
Start: 2024-07-29

## 2024-07-29 RX ORDER — BUPROPION HYDROCHLORIDE 150 MG/1
150 TABLET ORAL DAILY
Qty: 90 TABLET | Refills: 0 | Status: SHIPPED | OUTPATIENT
Start: 2024-07-29

## 2024-08-07 DIAGNOSIS — F41.9 ANXIETY: ICD-10-CM

## 2024-08-07 RX ORDER — DESVENLAFAXINE SUCCINATE 50 MG/1
50 TABLET, EXTENDED RELEASE ORAL DAILY
Qty: 90 TABLET | Refills: 0 | Status: SHIPPED | OUTPATIENT
Start: 2024-08-07

## 2024-08-19 DIAGNOSIS — R00.2 PALPITATIONS: ICD-10-CM

## 2024-08-19 RX ORDER — METOPROLOL SUCCINATE 25 MG/1
25 TABLET, EXTENDED RELEASE ORAL DAILY
Qty: 90 TABLET | Refills: 0 | Status: SHIPPED | OUTPATIENT
Start: 2024-08-19

## 2024-08-27 ENCOUNTER — TRANSCRIBE ORDERS (OUTPATIENT)
Dept: ADMINISTRATIVE | Facility: HOSPITAL | Age: 59
End: 2024-08-27
Payer: COMMERCIAL

## 2024-08-27 ENCOUNTER — LAB (OUTPATIENT)
Dept: LAB | Facility: HOSPITAL | Age: 59
End: 2024-08-27
Payer: COMMERCIAL

## 2024-08-27 DIAGNOSIS — Z91.018 ALLERGY TO OTHER FOODS: Primary | ICD-10-CM

## 2024-08-27 DIAGNOSIS — Z91.018 ALLERGY TO OTHER FOODS: ICD-10-CM

## 2024-08-27 PROCEDURE — 86003 ALLG SPEC IGE CRUDE XTRC EA: CPT

## 2024-08-27 PROCEDURE — 86008 ALLG SPEC IGE RECOMB EA: CPT

## 2024-08-27 PROCEDURE — 82785 ASSAY OF IGE: CPT

## 2024-08-27 PROCEDURE — 36415 COLL VENOUS BLD VENIPUNCTURE: CPT

## 2024-09-01 LAB
ALPHA-GAL IGE QN: 21.9 KU/L
BEEF IGE QN: 9.91 KU/L
CONV CLASS DESCRIPTION: ABNORMAL
IGE SERPL-ACNC: 201 IU/ML (ref 6–495)
LAMB IGE QN: 5.87 KU/L
PORK IGE QN: 5.12 KU/L

## 2024-10-08 DIAGNOSIS — G62.9 NEUROPATHY: ICD-10-CM

## 2024-10-08 DIAGNOSIS — F41.9 ANXIETY: ICD-10-CM

## 2024-10-08 RX ORDER — BUSPIRONE HYDROCHLORIDE 10 MG/1
TABLET ORAL
Qty: 180 TABLET | Refills: 0 | Status: SHIPPED | OUTPATIENT
Start: 2024-10-08

## 2024-10-08 RX ORDER — GABAPENTIN 100 MG/1
CAPSULE ORAL
Qty: 270 CAPSULE | Refills: 0 | Status: SHIPPED | OUTPATIENT
Start: 2024-10-08

## 2024-10-16 DIAGNOSIS — Z12.31 ENCOUNTER FOR SCREENING MAMMOGRAM FOR MALIGNANT NEOPLASM OF BREAST: Primary | ICD-10-CM

## 2024-10-29 DIAGNOSIS — R41.840 ATTENTION DEFICIT: ICD-10-CM

## 2024-10-29 RX ORDER — BUPROPION HYDROCHLORIDE 150 MG/1
150 TABLET ORAL DAILY
Qty: 90 TABLET | Refills: 0 | Status: SHIPPED | OUTPATIENT
Start: 2024-10-29

## 2024-10-31 DIAGNOSIS — I10 HYPERTENSION, UNSPECIFIED TYPE: ICD-10-CM

## 2024-10-31 RX ORDER — HYDROCHLOROTHIAZIDE 25 MG/1
25 TABLET ORAL DAILY
Qty: 90 TABLET | Refills: 0 | Status: SHIPPED | OUTPATIENT
Start: 2024-10-31

## 2024-11-05 RX ORDER — FLUTICASONE PROPIONATE 50 MCG
SPRAY, SUSPENSION (ML) NASAL
COMMUNITY
Start: 2024-10-18

## 2024-11-05 NOTE — PROGRESS NOTES
"Subjective     Asya Lambert is a 59 y.o. female.     History of Present Illness  Patient is here today with complaints of back pain, abdominal pain, and postmenopausal bleeding.  She has been seeing gynecology and last saw them in June.  She has a Mirena.  She takes oral estrogen for postmenopausal symptoms, did not need additional progesterone since she has the Mirena.  She has had it since 2018  She states on Thursday she wiped and there was blood  She reports she has been having left low back pain  She doesn't have a history of kidney stones  Denies any urinary issues  She always has urinary frequency, but this is not new  Denies burning with urination.          The following portions of the patient's history were reviewed and updated as appropriate: allergies, current medications, past family history, past medical history, past social history, past surgical history, and problem list.    Review of Systems   Constitutional:  Negative for chills, fatigue and fever.   Respiratory:  Negative for chest tightness and shortness of breath.    Cardiovascular:  Negative for chest pain and palpitations.   Genitourinary:  Positive for flank pain, frequency and pelvic pain. Negative for urgency.   Musculoskeletal:  Positive for back pain.   Neurological:  Negative for dizziness and headache.       Objective     /94 (BP Location: Left arm, Patient Position: Sitting, Cuff Size: Large Adult)   Pulse 64   Temp 98 °F (36.7 °C) (Temporal)   Ht 177.8 cm (70\")   Wt 93.9 kg (207 lb)   LMP 11/22/2019   SpO2 96%   BMI 29.70 kg/m²     Current Outpatient Medications on File Prior to Visit   Medication Sig Dispense Refill    ALPRAZolam (XANAX) 0.5 MG tablet TAKE 1/2 TO 1 TABLET BY MOUTH DAILY AS NEEDED FOR ANXIETY      buPROPion XL (WELLBUTRIN XL) 150 MG 24 hr tablet TAKE 1 TABLET BY MOUTH DAILY 90 tablet 0    busPIRone (BUSPAR) 10 MG tablet TAKE 1 TABLET BY MOUTH 2 TIMES A  tablet 0    cetirizine (zyrTEC) 5 MG tablet " Take 1 tablet by mouth Daily.      desvenlafaxine (PRISTIQ) 50 MG 24 hr tablet TAKE 1 TABLET BY MOUTH DAILY 90 tablet 0    estradiol (ESTRACE) 1 MG tablet Take 1 tablet by mouth Daily.      ezetimibe (ZETIA) 10 MG tablet TAKE 1 TABLET BY MOUTH DAILY 90 tablet 1    fluticasone (FLONASE) 50 MCG/ACT nasal spray       gabapentin (NEURONTIN) 100 MG capsule TAKE 1 CAPSULE BY MOUTH THREE TIMES DAILY 270 capsule 0    hydroCHLOROthiazide 25 MG tablet TAKE 1 TABLET BY MOUTH DAILY. 90 tablet 0    levonorgestrel (MIRENA) 20 MCG/24HR IUD 1 each by Intrauterine route.      metoprolol succinate XL (TOPROL-XL) 25 MG 24 hr tablet TAKE 1 TABLET BY MOUTH DAILY 90 tablet 0    omeprazole (priLOSEC) 40 MG capsule Take 1 capsule by mouth Daily. 90 capsule 1    azithromycin (Zithromax Z-Celestino) 250 MG tablet Take 2 tablets by mouth on day 1, then 1 tablet daily on days 2-5 (Patient not taking: Reported on 11/6/2024) 6 tablet 0    Cholecalciferol 50 MCG (2000 UT) capsule       Diclofenac Sodium (Pennsaid) 2 % solution Apply 2 Pump topically to the appropriate area as directed Daily As Needed. (Patient not taking: Reported on 11/6/2024)      loratadine (CLARITIN) 10 MG tablet Take 1 tablet by mouth Daily. (Patient not taking: Reported on 11/6/2024)      Semaglutide-Weight Management 0.25 MG/0.5ML solution auto-injector Inject 0.25 mg under the skin into the appropriate area as directed 1 (One) Time Per Week. (Patient not taking: Reported on 11/6/2024) 2 mL 0     No current facility-administered medications on file prior to visit.                 Physical Exam  Exam conducted with a chaperone present.   Constitutional:       General: She is not in acute distress.     Appearance: Normal appearance. She is not ill-appearing.   HENT:      Head: Normocephalic and atraumatic.   Eyes:      Extraocular Movements: Extraocular movements intact.   Cardiovascular:      Rate and Rhythm: Normal rate and regular rhythm.      Heart sounds: No murmur  heard.  Pulmonary:      Effort: Pulmonary effort is normal. No respiratory distress.   Genitourinary:     Exam position: Supine.      Labia:         Right: No rash or tenderness.         Left: No rash or tenderness.       Vagina: Normal.      Cervix: Normal. Discharge (some milky discharge) present. No friability, lesion, erythema or cervical bleeding.      Uterus: Not enlarged and not tender.       Adnexa:         Right: No mass or tenderness.          Left: No mass or tenderness.        Comments: IUD string present  Musculoskeletal:      Comments: So CVA tenderness   Neurological:      General: No focal deficit present.      Mental Status: She is alert and oriented to person, place, and time.   Psychiatric:         Mood and Affect: Mood normal.         Behavior: Behavior normal.         Thought Content: Thought content normal.         Judgment: Judgment normal.           Assessment & Plan     Diagnoses and all orders for this visit:    1. Pelvic pressure in female (Primary)  Comments:  no bleeding noted in vagina  get pelvic US to r/o ovarian issue or uterine  UA checked- normal  call if no imp  may need KUB to r/o stone if continues  Orders:  -     IGP,Aptima HPV,Age Gdln  -     POC Urinalysis Dipstick, Automated  -     US Pelvis Complete; Future  -     Basic metabolic panel; Future  -     CBC & Differential    2. Cervical cancer screening  -     IGP,Aptima HPV,Age Gdln      Brief Urine Lab Results  (Last result in the past 365 days)        Color   Clarity   Blood   Leuk Est   Nitrite   Protein   CREAT   Urine HCG        11/06/24 1123 Yellow   Clear   Negative   Negative   Negative   Negative

## 2024-11-06 ENCOUNTER — OFFICE VISIT (OUTPATIENT)
Dept: FAMILY MEDICINE CLINIC | Facility: CLINIC | Age: 59
End: 2024-11-06
Payer: COMMERCIAL

## 2024-11-06 ENCOUNTER — LAB (OUTPATIENT)
Dept: FAMILY MEDICINE CLINIC | Facility: CLINIC | Age: 59
End: 2024-11-06
Payer: COMMERCIAL

## 2024-11-06 VITALS
OXYGEN SATURATION: 96 % | SYSTOLIC BLOOD PRESSURE: 138 MMHG | BODY MASS INDEX: 29.63 KG/M2 | TEMPERATURE: 98 F | HEART RATE: 64 BPM | DIASTOLIC BLOOD PRESSURE: 94 MMHG | WEIGHT: 207 LBS | HEIGHT: 70 IN

## 2024-11-06 DIAGNOSIS — R10.2 PELVIC PRESSURE IN FEMALE: ICD-10-CM

## 2024-11-06 DIAGNOSIS — R10.2 PELVIC PRESSURE IN FEMALE: Primary | ICD-10-CM

## 2024-11-06 DIAGNOSIS — Z12.4 CERVICAL CANCER SCREENING: ICD-10-CM

## 2024-11-06 LAB
ANION GAP SERPL CALCULATED.3IONS-SCNC: 11.7 MMOL/L (ref 5–15)
BASOPHILS # BLD AUTO: 0.06 10*3/MM3 (ref 0–0.2)
BASOPHILS NFR BLD AUTO: 0.8 % (ref 0–1.5)
BILIRUB BLD-MCNC: NEGATIVE MG/DL
BUN SERPL-MCNC: 14 MG/DL (ref 6–20)
BUN/CREAT SERPL: 12.5 (ref 7–25)
CALCIUM SPEC-SCNC: 9.6 MG/DL (ref 8.6–10.5)
CHLORIDE SERPL-SCNC: 97 MMOL/L (ref 98–107)
CLARITY, POC: CLEAR
CO2 SERPL-SCNC: 25.3 MMOL/L (ref 22–29)
COLOR UR: YELLOW
CREAT SERPL-MCNC: 1.12 MG/DL (ref 0.57–1)
DEPRECATED RDW RBC AUTO: 44.1 FL (ref 37–54)
EGFRCR SERPLBLD CKD-EPI 2021: 57.1 ML/MIN/1.73
EOSINOPHIL # BLD AUTO: 0.17 10*3/MM3 (ref 0–0.4)
EOSINOPHIL NFR BLD AUTO: 2.2 % (ref 0.3–6.2)
ERYTHROCYTE [DISTWIDTH] IN BLOOD BY AUTOMATED COUNT: 12.7 % (ref 12.3–15.4)
EXPIRATION DATE: NORMAL
GLUCOSE SERPL-MCNC: 85 MG/DL (ref 65–99)
GLUCOSE UR STRIP-MCNC: NEGATIVE MG/DL
HCT VFR BLD AUTO: 42.5 % (ref 34–46.6)
HGB BLD-MCNC: 14.7 G/DL (ref 12–15.9)
IMM GRANULOCYTES # BLD AUTO: 0.04 10*3/MM3 (ref 0–0.05)
IMM GRANULOCYTES NFR BLD AUTO: 0.5 % (ref 0–0.5)
KETONES UR QL: NEGATIVE
LEUKOCYTE EST, POC: NEGATIVE
LYMPHOCYTES # BLD AUTO: 2.05 10*3/MM3 (ref 0.7–3.1)
LYMPHOCYTES NFR BLD AUTO: 26.3 % (ref 19.6–45.3)
Lab: NORMAL
MCH RBC QN AUTO: 32.8 PG (ref 26.6–33)
MCHC RBC AUTO-ENTMCNC: 34.6 G/DL (ref 31.5–35.7)
MCV RBC AUTO: 94.9 FL (ref 79–97)
MONOCYTES # BLD AUTO: 0.71 10*3/MM3 (ref 0.1–0.9)
MONOCYTES NFR BLD AUTO: 9.1 % (ref 5–12)
NEUTROPHILS NFR BLD AUTO: 4.76 10*3/MM3 (ref 1.7–7)
NEUTROPHILS NFR BLD AUTO: 61.1 % (ref 42.7–76)
NITRITE UR-MCNC: NEGATIVE MG/ML
NRBC BLD AUTO-RTO: 0 /100 WBC (ref 0–0.2)
PH UR: 6 [PH] (ref 5–8)
PLATELET # BLD AUTO: 317 10*3/MM3 (ref 140–450)
PMV BLD AUTO: 9.1 FL (ref 6–12)
POTASSIUM SERPL-SCNC: 4.8 MMOL/L (ref 3.5–5.2)
PROT UR STRIP-MCNC: NEGATIVE MG/DL
RBC # BLD AUTO: 4.48 10*6/MM3 (ref 3.77–5.28)
RBC # UR STRIP: NEGATIVE /UL
SODIUM SERPL-SCNC: 134 MMOL/L (ref 136–145)
SP GR UR: 1.01 (ref 1–1.03)
UROBILINOGEN UR QL: NORMAL
WBC NRBC COR # BLD AUTO: 7.79 10*3/MM3 (ref 3.4–10.8)

## 2024-11-06 PROCEDURE — 85025 COMPLETE CBC W/AUTO DIFF WBC: CPT | Performed by: NURSE PRACTITIONER

## 2024-11-06 PROCEDURE — 80048 BASIC METABOLIC PNL TOTAL CA: CPT | Performed by: NURSE PRACTITIONER

## 2024-11-06 PROCEDURE — 99214 OFFICE O/P EST MOD 30 MIN: CPT | Performed by: NURSE PRACTITIONER

## 2024-11-06 PROCEDURE — 81003 URINALYSIS AUTO W/O SCOPE: CPT | Performed by: NURSE PRACTITIONER

## 2024-11-06 PROCEDURE — 36415 COLL VENOUS BLD VENIPUNCTURE: CPT

## 2024-11-06 RX ORDER — CETIRIZINE HYDROCHLORIDE 5 MG/1
5 TABLET ORAL DAILY
COMMUNITY

## 2024-11-08 ENCOUNTER — HOSPITAL ENCOUNTER (OUTPATIENT)
Dept: GENERAL RADIOLOGY | Facility: HOSPITAL | Age: 59
Discharge: HOME | End: 2024-11-08
Admitting: NURSE PRACTITIONER
Payer: COMMERCIAL

## 2024-11-08 DIAGNOSIS — R10.9 FLANK PAIN: Primary | ICD-10-CM

## 2024-11-08 DIAGNOSIS — R10.9 FLANK PAIN: ICD-10-CM

## 2024-11-08 PROCEDURE — 74018 RADEX ABDOMEN 1 VIEW: CPT

## 2024-11-12 DIAGNOSIS — F41.9 ANXIETY: ICD-10-CM

## 2024-11-12 LAB
AGE GDLN ACOG TESTING: NORMAL
CYTOLOGIST CVX/VAG CYTO: NORMAL
CYTOLOGY CVX/VAG DOC CYTO: NORMAL
CYTOLOGY CVX/VAG DOC THIN PREP: NORMAL
DX ICD CODE: NORMAL
HPV GENOTYPE REFLEX: NORMAL
HPV I/H RISK 4 DNA CVX QL PROBE+SIG AMP: NEGATIVE
Lab: NORMAL
OTHER STN SPEC: NORMAL
STAT OF ADQ CVX/VAG CYTO-IMP: NORMAL

## 2024-11-12 RX ORDER — OMEPRAZOLE 40 MG/1
40 CAPSULE, DELAYED RELEASE ORAL DAILY
Qty: 90 CAPSULE | Refills: 0 | Status: SHIPPED | OUTPATIENT
Start: 2024-11-12

## 2024-11-12 RX ORDER — DESVENLAFAXINE 50 MG/1
50 TABLET, FILM COATED, EXTENDED RELEASE ORAL DAILY
Qty: 90 TABLET | Refills: 0 | Status: SHIPPED | OUTPATIENT
Start: 2024-11-12

## 2024-11-18 ENCOUNTER — HOSPITAL ENCOUNTER (OUTPATIENT)
Dept: ULTRASOUND IMAGING | Facility: HOSPITAL | Age: 59
Discharge: HOME | End: 2024-11-18
Admitting: NURSE PRACTITIONER
Payer: COMMERCIAL

## 2024-11-18 DIAGNOSIS — R10.2 PELVIC PRESSURE IN FEMALE: ICD-10-CM

## 2024-11-18 PROCEDURE — 76830 TRANSVAGINAL US NON-OB: CPT

## 2024-11-18 PROCEDURE — 76856 US EXAM PELVIC COMPLETE: CPT

## 2024-11-20 DIAGNOSIS — R10.2 PELVIC PRESSURE IN FEMALE: Primary | ICD-10-CM

## 2024-11-21 DIAGNOSIS — R00.2 PALPITATIONS: ICD-10-CM

## 2024-11-21 RX ORDER — METOPROLOL SUCCINATE 25 MG/1
25 TABLET, EXTENDED RELEASE ORAL DAILY
Qty: 90 TABLET | Refills: 0 | Status: SHIPPED | OUTPATIENT
Start: 2024-11-21

## 2024-11-26 ENCOUNTER — LAB (OUTPATIENT)
Dept: FAMILY MEDICINE CLINIC | Facility: CLINIC | Age: 59
End: 2024-11-26
Payer: COMMERCIAL

## 2024-11-26 DIAGNOSIS — R10.9 FLANK PAIN: ICD-10-CM

## 2024-11-26 PROCEDURE — 36415 COLL VENOUS BLD VENIPUNCTURE: CPT

## 2024-11-26 PROCEDURE — 80048 BASIC METABOLIC PNL TOTAL CA: CPT | Performed by: NURSE PRACTITIONER

## 2024-11-27 LAB
ANION GAP SERPL CALCULATED.3IONS-SCNC: 13.4 MMOL/L (ref 5–15)
BUN SERPL-MCNC: 14 MG/DL (ref 6–20)
BUN/CREAT SERPL: 13.6 (ref 7–25)
CALCIUM SPEC-SCNC: 9.6 MG/DL (ref 8.6–10.5)
CHLORIDE SERPL-SCNC: 98 MMOL/L (ref 98–107)
CO2 SERPL-SCNC: 25.6 MMOL/L (ref 22–29)
CREAT SERPL-MCNC: 1.03 MG/DL (ref 0.57–1)
EGFRCR SERPLBLD CKD-EPI 2021: 63.2 ML/MIN/1.73
GLUCOSE SERPL-MCNC: 92 MG/DL (ref 65–99)
POTASSIUM SERPL-SCNC: 4.6 MMOL/L (ref 3.5–5.2)
SODIUM SERPL-SCNC: 137 MMOL/L (ref 136–145)

## 2025-01-02 DIAGNOSIS — E78.5 HYPERLIPIDEMIA, UNSPECIFIED HYPERLIPIDEMIA TYPE: ICD-10-CM

## 2025-01-02 RX ORDER — EZETIMIBE 10 MG/1
10 TABLET ORAL DAILY
Qty: 90 TABLET | Refills: 0 | Status: SHIPPED | OUTPATIENT
Start: 2025-01-02

## 2025-01-13 DIAGNOSIS — G62.9 NEUROPATHY: ICD-10-CM

## 2025-01-14 RX ORDER — GABAPENTIN 100 MG/1
CAPSULE ORAL
Qty: 270 CAPSULE | Refills: 0 | Status: SHIPPED | OUTPATIENT
Start: 2025-01-14

## 2025-01-16 DIAGNOSIS — F41.9 ANXIETY: ICD-10-CM

## 2025-01-16 RX ORDER — BUSPIRONE HYDROCHLORIDE 10 MG/1
TABLET ORAL
Qty: 180 TABLET | Refills: 0 | Status: SHIPPED | OUTPATIENT
Start: 2025-01-16

## 2025-01-29 DIAGNOSIS — R41.840 ATTENTION DEFICIT: ICD-10-CM

## 2025-01-29 DIAGNOSIS — I10 HYPERTENSION, UNSPECIFIED TYPE: ICD-10-CM

## 2025-01-30 RX ORDER — HYDROCHLOROTHIAZIDE 25 MG/1
25 TABLET ORAL DAILY
Qty: 90 TABLET | Refills: 0 | Status: SHIPPED | OUTPATIENT
Start: 2025-01-30

## 2025-01-30 RX ORDER — BUPROPION HYDROCHLORIDE 150 MG/1
150 TABLET ORAL DAILY
Qty: 90 TABLET | Refills: 0 | Status: SHIPPED | OUTPATIENT
Start: 2025-01-30

## 2025-02-14 RX ORDER — OMEPRAZOLE 40 MG/1
40 CAPSULE, DELAYED RELEASE ORAL DAILY
Qty: 90 CAPSULE | Refills: 0 | Status: SHIPPED | OUTPATIENT
Start: 2025-02-14

## 2025-02-20 DIAGNOSIS — R00.2 PALPITATIONS: ICD-10-CM

## 2025-02-20 DIAGNOSIS — F41.9 ANXIETY: ICD-10-CM

## 2025-02-20 RX ORDER — BUSPIRONE HYDROCHLORIDE 10 MG/1
10 TABLET ORAL 2 TIMES DAILY
Qty: 180 TABLET | Refills: 0 | Status: SHIPPED | OUTPATIENT
Start: 2025-02-20

## 2025-02-20 RX ORDER — ESTRADIOL 1 MG/1
1 TABLET ORAL DAILY
Qty: 90 TABLET | Refills: 1 | Status: SHIPPED | OUTPATIENT
Start: 2025-02-20

## 2025-02-20 RX ORDER — METOPROLOL SUCCINATE 25 MG/1
25 TABLET, EXTENDED RELEASE ORAL DAILY
Qty: 90 TABLET | Refills: 0 | Status: SHIPPED | OUTPATIENT
Start: 2025-02-20

## 2025-02-20 RX ORDER — DESVENLAFAXINE 50 MG/1
50 TABLET, FILM COATED, EXTENDED RELEASE ORAL DAILY
Qty: 90 TABLET | Refills: 0 | Status: SHIPPED | OUTPATIENT
Start: 2025-02-20

## 2025-02-27 DIAGNOSIS — R41.840 ATTENTION DEFICIT: ICD-10-CM

## 2025-02-27 DIAGNOSIS — E78.5 HYPERLIPIDEMIA, UNSPECIFIED HYPERLIPIDEMIA TYPE: ICD-10-CM

## 2025-02-27 DIAGNOSIS — I10 HYPERTENSION, UNSPECIFIED TYPE: ICD-10-CM

## 2025-02-27 RX ORDER — HYDROCHLOROTHIAZIDE 25 MG/1
25 TABLET ORAL DAILY
Qty: 90 TABLET | Refills: 0 | Status: SHIPPED | OUTPATIENT
Start: 2025-02-27

## 2025-02-27 RX ORDER — EZETIMIBE 10 MG/1
10 TABLET ORAL DAILY
Qty: 90 TABLET | Refills: 0 | Status: SHIPPED | OUTPATIENT
Start: 2025-02-27

## 2025-02-27 RX ORDER — BUPROPION HYDROCHLORIDE 150 MG/1
150 TABLET ORAL DAILY
Qty: 90 TABLET | Refills: 0 | Status: SHIPPED | OUTPATIENT
Start: 2025-02-27

## 2025-03-05 ENCOUNTER — OFFICE VISIT (OUTPATIENT)
Dept: FAMILY MEDICINE CLINIC | Facility: CLINIC | Age: 60
End: 2025-03-05
Payer: COMMERCIAL

## 2025-03-05 VITALS
TEMPERATURE: 98.1 F | BODY MASS INDEX: 29.2 KG/M2 | HEART RATE: 60 BPM | SYSTOLIC BLOOD PRESSURE: 145 MMHG | OXYGEN SATURATION: 96 % | HEIGHT: 70 IN | DIASTOLIC BLOOD PRESSURE: 84 MMHG | WEIGHT: 204 LBS

## 2025-03-05 DIAGNOSIS — B02.9 HERPES ZOSTER WITHOUT COMPLICATION: ICD-10-CM

## 2025-03-05 DIAGNOSIS — B37.31 YEAST VAGINITIS: ICD-10-CM

## 2025-03-05 DIAGNOSIS — R21 RASH: Primary | ICD-10-CM

## 2025-03-05 PROCEDURE — 99213 OFFICE O/P EST LOW 20 MIN: CPT

## 2025-03-05 RX ORDER — FLUCONAZOLE 150 MG/1
150 TABLET ORAL ONCE
Qty: 1 TABLET | Refills: 0 | Status: SHIPPED | OUTPATIENT
Start: 2025-03-05 | End: 2025-03-05

## 2025-03-05 RX ORDER — PROGESTERONE 200 MG/1
200 CAPSULE ORAL DAILY
COMMUNITY
Start: 2025-01-03

## 2025-03-05 RX ORDER — VALACYCLOVIR HYDROCHLORIDE 1 G/1
1000 TABLET, FILM COATED ORAL 3 TIMES DAILY
Qty: 21 TABLET | Refills: 0 | Status: SHIPPED | OUTPATIENT
Start: 2025-03-05 | End: 2025-03-12

## 2025-03-05 NOTE — PROGRESS NOTES
"Chief Complaint  Rash (Possible shingles pain,bumps and redness around  navel feels like nerve pain. Headache yesterday   chills)    Subjective        Asya Lambert presents to [facility]  History of Present Illness    Pt presents today for a rash.  She thinks it could be shingles.  They are painful bumps and redness around her bellybutton.  She says it feels like nerve pain/tingly.  She also reports having a headache, nausea, chills yesterday. The rash started Sunday. She had shingles as a teen and wants to catch this early.    Objective   Vital Signs:  /84   Pulse 60   Temp 98.1 °F (36.7 °C) (Temporal)   Ht 177.8 cm (70\")   Wt 92.5 kg (204 lb)   SpO2 96%   BMI 29.27 kg/m²   Estimated body mass index is 29.27 kg/m² as calculated from the following:    Height as of this encounter: 177.8 cm (70\").    Weight as of this encounter: 92.5 kg (204 lb).                Physical Exam  Vitals reviewed.   Constitutional:       General: She is not in acute distress.     Appearance: Normal appearance. She is not ill-appearing, toxic-appearing or diaphoretic.   Cardiovascular:      Rate and Rhythm: Normal rate and regular rhythm.      Pulses: Normal pulses.      Heart sounds: Normal heart sounds.   Pulmonary:      Effort: Pulmonary effort is normal. No respiratory distress.      Breath sounds: Normal breath sounds.   Skin:     General: Skin is warm and dry.      Capillary Refill: Capillary refill takes less than 2 seconds.      Findings: Rash (papules above navel) present.   Neurological:      General: No focal deficit present.      Mental Status: She is alert and oriented to person, place, and time.   Psychiatric:         Mood and Affect: Mood normal.         Behavior: Behavior normal.         Thought Content: Thought content normal.         Judgment: Judgment normal.        Result Review :                Assessment and Plan   Diagnoses and all orders for this visit:    1. Rash " (Primary)    2. Herpes zoster without complication  Comments:  - Unsure if the rash is shingles above her navel, but would rather treat than not and it get worse.  Orders:  -     valACYclovir (Valtrex) 1000 MG tablet; Take 1 tablet by mouth 3 (Three) Times a Day for 7 days.  Dispense: 21 tablet; Refill: 0    3. Yeast vaginitis  -     fluconazole (Diflucan) 150 MG tablet; Take 1 tablet by mouth 1 (One) Time for 1 dose.  Dispense: 1 tablet; Refill: 0             Follow Up   Return if symptoms worsen or fail to improve.  Patient was given instructions and counseling regarding her condition or for health maintenance advice. Please see specific information pulled into the AVS if appropriate.

## 2025-03-12 ENCOUNTER — OFFICE VISIT (OUTPATIENT)
Dept: FAMILY MEDICINE CLINIC | Facility: CLINIC | Age: 60
End: 2025-03-12
Payer: COMMERCIAL

## 2025-03-12 VITALS
TEMPERATURE: 98.4 F | HEART RATE: 84 BPM | BODY MASS INDEX: 29.23 KG/M2 | HEIGHT: 70 IN | SYSTOLIC BLOOD PRESSURE: 145 MMHG | WEIGHT: 204.2 LBS | DIASTOLIC BLOOD PRESSURE: 91 MMHG | OXYGEN SATURATION: 98 %

## 2025-03-12 DIAGNOSIS — R10.2 PELVIC PAIN: Primary | ICD-10-CM

## 2025-03-12 LAB
BILIRUB BLD-MCNC: NEGATIVE MG/DL
CLARITY, POC: CLEAR
COLOR UR: YELLOW
EXPIRATION DATE: NORMAL
GLUCOSE UR STRIP-MCNC: NEGATIVE MG/DL
KETONES UR QL: NEGATIVE
LEUKOCYTE EST, POC: NEGATIVE
Lab: NORMAL
NITRITE UR-MCNC: NEGATIVE MG/ML
PH UR: 6 [PH] (ref 5–8)
PROT UR STRIP-MCNC: NEGATIVE MG/DL
RBC # UR STRIP: NEGATIVE /UL
SP GR UR: 1 (ref 1–1.03)
UROBILINOGEN UR QL: NORMAL

## 2025-03-12 PROCEDURE — 99214 OFFICE O/P EST MOD 30 MIN: CPT | Performed by: NURSE PRACTITIONER

## 2025-03-12 PROCEDURE — 81003 URINALYSIS AUTO W/O SCOPE: CPT | Performed by: NURSE PRACTITIONER

## 2025-03-12 RX ORDER — CEPHALEXIN 500 MG/1
500 CAPSULE ORAL 2 TIMES DAILY
Qty: 14 CAPSULE | Refills: 0 | Status: SHIPPED | OUTPATIENT
Start: 2025-03-12

## 2025-03-12 RX ORDER — GABAPENTIN 300 MG/1
300 CAPSULE ORAL 3 TIMES DAILY
Qty: 90 CAPSULE | Refills: 0 | Status: SHIPPED | OUTPATIENT
Start: 2025-03-12

## 2025-03-12 NOTE — PROGRESS NOTES
Chief Complaint  1 WEEK F/U (Shingles )    Subjective        Asya Lambert presents to [facility]  History of Present Illness  Asya is a 59-year-old female presenting today to follow-up on her shingles.  She was seen in our office on 3/5/2025 with complaints of a rash. She was given valacyclovir 1000 mg 3 times daily x 7 days. She has one tablet left. The rash has almost resolved, but she continues to have moderate to severe abdominal pain. It is mostly in her lower abdomen and around her umbilicus. She says it is painful to sit up straight. She reports pelvic pain with urination.       The following portions of the patient's history were reviewed and updated as appropriate: allergies, current medications, past family history, past medical history, past social history, past surgical history and problem list.    Allergies   Allergen Reactions    Sulfa Antibiotics Other (See Comments)     LIPS SWELLING  Other reaction(s): Other (See Comments)  LIPS SWELLING    Sulfamethoxazole-Trimethoprim Anaphylaxis    Meperidine Nausea And Vomiting    Moxifloxacin Nausea And Vomiting     Emesis        Patient Active Problem List   Diagnosis    Anxiety    Closed fracture of middle or proximal phalanx or phalanges of hand    Deviated septum    Gastroesophageal reflux disease    History of sinus surgery    Hyperlipidemia       Current Outpatient Medications   Medication Instructions    ALPRAZolam (XANAX) 0.5 MG tablet TAKE 1/2 TO 1 TABLET BY MOUTH DAILY AS NEEDED FOR ANXIETY    buPROPion XL (WELLBUTRIN XL) 150 mg, Oral, Daily    busPIRone (BUSPAR) 10 mg, Oral, 2 Times Daily    cephalexin (KEFLEX) 500 mg, Oral, 2 Times Daily    cetirizine (ZYRTEC) 5 mg, Daily    Cholecalciferol 50 MCG (2000 UT) capsule No dose, route, or frequency recorded.    desvenlafaxine (PRISTIQ) 50 mg, Oral, Daily    estradiol (ESTRACE) 1 mg, Oral, Daily    ezetimibe (ZETIA) 10 mg, Oral, Daily    fluticasone (FLONASE) 50 MCG/ACT  "nasal spray     gabapentin (NEURONTIN) 100 MG capsule TAKE 1 CAPSULE BY MOUTH THREE TIMES DAILY    gabapentin (NEURONTIN) 300 mg, Oral, 3 Times Daily    hydroCHLOROthiazide 25 mg, Oral, Daily    metoprolol succinate XL (TOPROL-XL) 25 mg, Oral, Daily    omeprazole (PRILOSEC) 40 mg, Oral, Daily    Progesterone (PROMETRIUM) 200 mg, Daily    valACYclovir (VALTREX) 1,000 mg, Oral, 3 Times Daily          Objective   Vital Signs:  /91 (BP Location: Left arm, Patient Position: Sitting, Cuff Size: Large Adult)   Pulse 84   Temp 98.4 °F (36.9 °C) (Temporal)   Ht 177.8 cm (70\")   Wt 92.6 kg (204 lb 3.2 oz)   SpO2 98%   BMI 29.30 kg/m²   Estimated body mass index is 29.3 kg/m² as calculated from the following:    Height as of this encounter: 177.8 cm (70\").    Weight as of this encounter: 92.6 kg (204 lb 3.2 oz).             Review of Systems   Constitutional:  Negative for activity change, chills, fatigue, fever and unexpected weight change.   Cardiovascular:  Negative for leg swelling.   Gastrointestinal:  Positive for abdominal pain.   Musculoskeletal:  Negative for arthralgias, back pain, gait problem, joint swelling, myalgias, neck pain and neck stiffness.   Neurological:  Negative for weakness.        Physical Exam  Constitutional:       Appearance: Normal appearance.   Cardiovascular:      Rate and Rhythm: Normal rate and regular rhythm.   Pulmonary:      Effort: Pulmonary effort is normal.      Breath sounds: Normal breath sounds.   Abdominal:      Tenderness: There is abdominal tenderness in the periumbilical area and suprapubic area.       Skin:     General: Skin is warm and dry.   Neurological:      Mental Status: She is alert and oriented to person, place, and time.   Psychiatric:         Mood and Affect: Mood normal.         Behavior: Behavior normal.         Thought Content: Thought content normal.         Judgment: Judgment normal.      Result Review :                   Assessment and Plan   Diagnoses " and all orders for this visit:    1. Pelvic pain (Primary)  Comments:  UA normal.  Pain most likely post herpatic neuralgia.  Patient is already on gabapentin 100 mg 3 times daily for neuropathy.  Will increase to 300 mg 3 times daily for her neuralgia.  Will start Keflex for a possible infection of her umbilicus.  May apply warm compresses.  RTO if symptoms persist or if pain worsens.  Orders:  -     POC Urinalysis Dipstick, Automated  -     gabapentin (NEURONTIN) 300 MG capsule; Take 1 capsule by mouth 3 (Three) Times a Day.  Dispense: 90 capsule; Refill: 0    Other orders  -     cephalexin (KEFLEX) 500 MG capsule; Take 1 capsule by mouth 2 (Two) Times a Day.  Dispense: 14 capsule; Refill: 0             Follow Up   No follow-ups on file.  Patient was given instructions and counseling regarding her condition or for health maintenance advice. Please see specific information pulled into the AVS if appropriate.

## 2025-03-14 ENCOUNTER — APPOINTMENT (OUTPATIENT)
Dept: CT IMAGING | Facility: HOSPITAL | Age: 60
End: 2025-03-14
Payer: COMMERCIAL

## 2025-03-14 ENCOUNTER — HOSPITAL ENCOUNTER (EMERGENCY)
Facility: HOSPITAL | Age: 60
Discharge: HOME | End: 2025-03-14
Attending: EMERGENCY MEDICINE
Payer: COMMERCIAL

## 2025-03-14 VITALS
RESPIRATION RATE: 16 BRPM | OXYGEN SATURATION: 97 % | SYSTOLIC BLOOD PRESSURE: 120 MMHG | BODY MASS INDEX: 31.75 KG/M2 | DIASTOLIC BLOOD PRESSURE: 78 MMHG | HEART RATE: 58 BPM | TEMPERATURE: 98.4 F | WEIGHT: 202.3 LBS | HEIGHT: 67 IN

## 2025-03-14 DIAGNOSIS — L08.9 SOFT TISSUE INFECTION: Primary | ICD-10-CM

## 2025-03-14 LAB
ALBUMIN SERPL-MCNC: 4.3 G/DL (ref 3.5–5.2)
ALBUMIN/GLOB SERPL: 1.3 G/DL
ALP SERPL-CCNC: 86 U/L (ref 39–117)
ALT SERPL W P-5'-P-CCNC: 19 U/L (ref 1–33)
ANION GAP SERPL CALCULATED.3IONS-SCNC: 13.8 MMOL/L (ref 5–15)
AST SERPL-CCNC: 18 U/L (ref 1–32)
BASOPHILS # BLD AUTO: 0.04 10*3/MM3 (ref 0–0.2)
BASOPHILS NFR BLD AUTO: 0.5 % (ref 0–1.5)
BILIRUB SERPL-MCNC: 0.5 MG/DL (ref 0–1.2)
BILIRUB UR QL STRIP: NEGATIVE
BUN SERPL-MCNC: 13 MG/DL (ref 6–20)
BUN/CREAT SERPL: 13.7 (ref 7–25)
CALCIUM SPEC-SCNC: 9.6 MG/DL (ref 8.6–10.5)
CHLORIDE SERPL-SCNC: 98 MMOL/L (ref 98–107)
CLARITY UR: CLEAR
CO2 SERPL-SCNC: 24.2 MMOL/L (ref 22–29)
COLOR UR: YELLOW
CREAT SERPL-MCNC: 0.95 MG/DL (ref 0.57–1)
DEPRECATED RDW RBC AUTO: 44.1 FL (ref 37–54)
EGFRCR SERPLBLD CKD-EPI 2021: 69.6 ML/MIN/1.73
EOSINOPHIL # BLD AUTO: 0.12 10*3/MM3 (ref 0–0.4)
EOSINOPHIL NFR BLD AUTO: 1.6 % (ref 0.3–6.2)
ERYTHROCYTE [DISTWIDTH] IN BLOOD BY AUTOMATED COUNT: 13 % (ref 12.3–15.4)
GLOBULIN UR ELPH-MCNC: 3.2 GM/DL
GLUCOSE SERPL-MCNC: 93 MG/DL (ref 65–99)
GLUCOSE UR STRIP-MCNC: NEGATIVE MG/DL
HCT VFR BLD AUTO: 41.6 % (ref 34–46.6)
HGB BLD-MCNC: 14.2 G/DL (ref 12–15.9)
HGB UR QL STRIP.AUTO: NEGATIVE
IMM GRANULOCYTES # BLD AUTO: 0.02 10*3/MM3 (ref 0–0.05)
IMM GRANULOCYTES NFR BLD AUTO: 0.3 % (ref 0–0.5)
KETONES UR QL STRIP: NEGATIVE
LEUKOCYTE ESTERASE UR QL STRIP.AUTO: NEGATIVE
LIPASE SERPL-CCNC: 36 U/L (ref 13–60)
LYMPHOCYTES # BLD AUTO: 2.15 10*3/MM3 (ref 0.7–3.1)
LYMPHOCYTES NFR BLD AUTO: 28.6 % (ref 19.6–45.3)
MCH RBC QN AUTO: 31.5 PG (ref 26.6–33)
MCHC RBC AUTO-ENTMCNC: 34.1 G/DL (ref 31.5–35.7)
MCV RBC AUTO: 92.2 FL (ref 79–97)
MONOCYTES # BLD AUTO: 0.61 10*3/MM3 (ref 0.1–0.9)
MONOCYTES NFR BLD AUTO: 8.1 % (ref 5–12)
NEUTROPHILS NFR BLD AUTO: 4.57 10*3/MM3 (ref 1.7–7)
NEUTROPHILS NFR BLD AUTO: 60.9 % (ref 42.7–76)
NITRITE UR QL STRIP: NEGATIVE
PH UR STRIP.AUTO: 6.5 [PH] (ref 5–8)
PLATELET # BLD AUTO: 291 10*3/MM3 (ref 140–450)
PMV BLD AUTO: 8.8 FL (ref 6–12)
POTASSIUM SERPL-SCNC: 3.6 MMOL/L (ref 3.5–5.2)
PROT SERPL-MCNC: 7.5 G/DL (ref 6–8.5)
PROT UR QL STRIP: NEGATIVE
RBC # BLD AUTO: 4.51 10*6/MM3 (ref 3.77–5.28)
SODIUM SERPL-SCNC: 136 MMOL/L (ref 136–145)
SP GR UR STRIP: 1.01 (ref 1–1.03)
UROBILINOGEN UR QL STRIP: NORMAL
WBC NRBC COR # BLD AUTO: 7.51 10*3/MM3 (ref 3.4–10.8)

## 2025-03-14 PROCEDURE — 83690 ASSAY OF LIPASE: CPT | Performed by: EMERGENCY MEDICINE

## 2025-03-14 PROCEDURE — 25810000003 LACTATED RINGERS SOLUTION: Performed by: EMERGENCY MEDICINE

## 2025-03-14 PROCEDURE — 99284 EMERGENCY DEPT VISIT MOD MDM: CPT | Performed by: EMERGENCY MEDICINE

## 2025-03-14 PROCEDURE — 80053 COMPREHEN METABOLIC PANEL: CPT | Performed by: EMERGENCY MEDICINE

## 2025-03-14 PROCEDURE — 81003 URINALYSIS AUTO W/O SCOPE: CPT | Performed by: EMERGENCY MEDICINE

## 2025-03-14 PROCEDURE — 85025 COMPLETE CBC W/AUTO DIFF WBC: CPT | Performed by: EMERGENCY MEDICINE

## 2025-03-14 PROCEDURE — 99285 EMERGENCY DEPT VISIT HI MDM: CPT

## 2025-03-14 PROCEDURE — 74177 CT ABD & PELVIS W/CONTRAST: CPT

## 2025-03-14 PROCEDURE — 25510000001 IOPAMIDOL PER 1 ML: Performed by: EMERGENCY MEDICINE

## 2025-03-14 RX ORDER — DOXYCYCLINE 100 MG/1
100 CAPSULE ORAL 2 TIMES DAILY
Qty: 20 CAPSULE | Refills: 0 | Status: SHIPPED | OUTPATIENT
Start: 2025-03-14 | End: 2025-03-24

## 2025-03-14 RX ORDER — SODIUM CHLORIDE 0.9 % (FLUSH) 0.9 %
10 SYRINGE (ML) INJECTION AS NEEDED
Status: DISCONTINUED | OUTPATIENT
Start: 2025-03-14 | End: 2025-03-14 | Stop reason: HOSPADM

## 2025-03-14 RX ORDER — IOPAMIDOL 755 MG/ML
100 INJECTION, SOLUTION INTRAVASCULAR
Status: COMPLETED | OUTPATIENT
Start: 2025-03-14 | End: 2025-03-14

## 2025-03-14 RX ADMIN — SODIUM CHLORIDE, POTASSIUM CHLORIDE, SODIUM LACTATE AND CALCIUM CHLORIDE 500 ML: 600; 310; 30; 20 INJECTION, SOLUTION INTRAVENOUS at 12:14

## 2025-03-14 RX ADMIN — IOPAMIDOL 100 ML: 755 INJECTION, SOLUTION INTRAVENOUS at 12:26

## 2025-03-14 NOTE — FSED PROVIDER NOTE
Subjective   History of Present Illness  59-year-old female presents complaining some umbilical pain.  She says that she was started on Valtrex for potential shingles last week 3 weeks ago, she says that that did not help and the pain has continued and she has a little redness around it.  She states she had a small amount of discharge from the bellybutton so she presents for evaluation.  Also thinks she has some upper abdominal pain in addition to this.  No nausea vomiting or fevers or chills  Review of Systems  As noted in HPI  Past Medical History:   Diagnosis Date    Allergy to alpha-gal     Anxiety     Arthritis     Fibromyalgia, primary     GERD (gastroesophageal reflux disease)     Hyperlipidemia     Hypertension     Neuropathy        Allergies   Allergen Reactions    Sulfa Antibiotics Other (See Comments)     LIPS SWELLING  Other reaction(s): Other (See Comments)  LIPS SWELLING    Sulfamethoxazole-Trimethoprim Anaphylaxis    Meperidine Nausea And Vomiting    Moxifloxacin Nausea And Vomiting     Emesis        Past Surgical History:   Procedure Laterality Date    APPENDECTOMY      BILATERAL BREAST REDUCTION      CERVICAL FUSION       SECTION      SINUS SURGERY      TUBAL ABDOMINAL LIGATION      WRIST SURGERY      left       Family History   Problem Relation Age of Onset    COPD Mother     Cancer Father         melanoma    Arthritis Father     Cancer Sister         uterine    Alzheimer's disease Maternal Grandmother     Heart disease Maternal Grandfather     Diabetes Paternal Grandmother     Cancer Sister        Social History     Socioeconomic History    Marital status:    Tobacco Use    Smoking status: Never     Passive exposure: Never    Smokeless tobacco: Never   Vaping Use    Vaping status: Never Used   Substance and Sexual Activity    Alcohol use: Yes     Comment: 4-6 drinks weekly    Drug use: Never    Sexual activity: Yes     Partners: Male     Birth control/protection: I.U.D.            Objective   Physical Exam  Nursing note reviewed.  INITIAL VITAL SIGNS: Reviewed by me.  Pulse ox normal  GENERAL: Alert and interactive. No acute distress.  HEAD: Head is normocephalic.  EYES: EOMI. PERRL. No scleral icterus. No conjunctival injection.  ENT: Moist mucous membranes.   NECK: Supple. Full range of motion.  RESPIRATORY: No tachypnea. Clear breath sounds bilaterally. No wheezing. No rales. No rhonchi.  CV: Regular rate and rhythm. No murmurs. No rubs or gallops.  ABDOMEN: Soft, nondistended, mildly tender in the supraumbilical region, immediately around the umbilicus is some erythema and induration of the skin, there is no fluctuance  BACK:  No obvious deformity.  EXTREMITIES: No deformity. No clubbing or cyanosis. No edema.   SKIN: Warm and dry. No diaphoresis. No obvious rashes.   NEUROLOGIC: Alert and oriented. Face is symmetric. Speech is normal. Moves all extremities equally. Motor and sensory distally intact.       Procedures           ED Course  ED Course as of 03/14/25 1321   Fri Mar 14, 2025   1320 H&P as above, 59-year-old female with what appears to be some soft tissue infection around the umbilicus.  She did have some abdominal pain on exam, workup was obtained showing reassuring CBC CMP lipase and urinalysis.  CT scan shows a small fluid collection questionable abscess versus urachal cyst, she has not had repetitive infections there given her age would be unlikely to be a urachal cyst but will have her take antibiotics and follow-up with general surgery. [RO]      ED Course User Index  [RO] Jaya Peterson MD                                           Medical Decision Making  Problems Addressed:  Soft tissue infection: complicated acute illness or injury    Amount and/or Complexity of Data Reviewed  Labs: ordered. Decision-making details documented in ED Course.  Radiology: ordered. Decision-making details documented in ED Course.    Risk  Prescription drug  management.        Final diagnoses:   Soft tissue infection       ED Disposition  ED Disposition       ED Disposition   Discharge    Condition   Good    Comment   --               [facility] [address]  [facility] [address]  Call   To schedule follow-up appointment         Medication List        New Prescriptions      doxycycline 100 MG capsule  Commonly known as: MONODOX  Take 1 capsule by mouth 2 (Two) Times a Day for 10 days.               Where to Get Your Medications        These medications were sent to [facility]/pharmacy #[id number] - [facility] [place] - [address] [facility] - [phone] [place] [phone]   [address] [facility] [place] [address]      Phone: [phone]   doxycycline 100 MG capsule

## 2025-03-14 NOTE — DISCHARGE INSTRUCTIONS
Take medications as prescribed.  Follow-up with general surgery as discussed.  Return to medical care if you develop worsening symptoms despite antibiotics including but limited to worsening pain, worsening swelling, fevers and bodyaches or for any other concerns

## 2025-03-17 ENCOUNTER — HOSPITAL ENCOUNTER (EMERGENCY)
Facility: HOSPITAL | Age: 60
Discharge: HOME | End: 2025-03-18
Attending: EMERGENCY MEDICINE | Admitting: EMERGENCY MEDICINE
Payer: COMMERCIAL

## 2025-03-17 ENCOUNTER — HOSPITAL ENCOUNTER (EMERGENCY)
Facility: HOSPITAL | Age: 60
Discharge: LEFT BEFORE DISCHARGE | End: 2025-03-17
Attending: EMERGENCY MEDICINE
Payer: COMMERCIAL

## 2025-03-17 VITALS
DIASTOLIC BLOOD PRESSURE: 77 MMHG | TEMPERATURE: 97.9 F | SYSTOLIC BLOOD PRESSURE: 125 MMHG | BODY MASS INDEX: 31.76 KG/M2 | HEART RATE: 62 BPM | HEIGHT: 67 IN | OXYGEN SATURATION: 97 % | WEIGHT: 202.38 LBS | RESPIRATION RATE: 18 BRPM

## 2025-03-17 DIAGNOSIS — L03.311 ABDOMINAL WALL CELLULITIS: ICD-10-CM

## 2025-03-17 DIAGNOSIS — K42.9 UMBILICAL HERNIA WITHOUT OBSTRUCTION AND WITHOUT GANGRENE: Primary | ICD-10-CM

## 2025-03-17 PROCEDURE — 99211 OFF/OP EST MAY X REQ PHY/QHP: CPT | Performed by: EMERGENCY MEDICINE

## 2025-03-17 PROCEDURE — 99283 EMERGENCY DEPT VISIT LOW MDM: CPT

## 2025-03-18 ENCOUNTER — APPOINTMENT (OUTPATIENT)
Dept: CT IMAGING | Facility: HOSPITAL | Age: 60
End: 2025-03-18
Payer: COMMERCIAL

## 2025-03-18 VITALS
TEMPERATURE: 98.5 F | OXYGEN SATURATION: 99 % | HEIGHT: 67 IN | DIASTOLIC BLOOD PRESSURE: 99 MMHG | WEIGHT: 202 LBS | BODY MASS INDEX: 31.71 KG/M2 | HEART RATE: 61 BPM | SYSTOLIC BLOOD PRESSURE: 151 MMHG | RESPIRATION RATE: 24 BRPM

## 2025-03-18 DIAGNOSIS — L08.9 INFECTED SEBACEOUS CYST OF SKIN: ICD-10-CM

## 2025-03-18 DIAGNOSIS — L03.319 CELLULITIS OF PERIUMBILICAL REGION: Primary | ICD-10-CM

## 2025-03-18 DIAGNOSIS — L72.3 INFECTED SEBACEOUS CYST OF SKIN: ICD-10-CM

## 2025-03-18 LAB
ALBUMIN SERPL-MCNC: 4.4 G/DL (ref 3.5–5.2)
ALBUMIN/GLOB SERPL: 1.3 G/DL
ALP SERPL-CCNC: 98 U/L (ref 39–117)
ALT SERPL W P-5'-P-CCNC: 18 U/L (ref 1–33)
ANION GAP SERPL CALCULATED.3IONS-SCNC: 14.3 MMOL/L (ref 5–15)
AST SERPL-CCNC: 19 U/L (ref 1–32)
BASOPHILS # BLD AUTO: 0.04 10*3/MM3 (ref 0–0.2)
BASOPHILS NFR BLD AUTO: 0.5 % (ref 0–1.5)
BILIRUB SERPL-MCNC: 0.5 MG/DL (ref 0–1.2)
BUN SERPL-MCNC: 14 MG/DL (ref 6–20)
BUN/CREAT SERPL: 13.7 (ref 7–25)
CALCIUM SPEC-SCNC: 9.6 MG/DL (ref 8.6–10.5)
CHLORIDE SERPL-SCNC: 99 MMOL/L (ref 98–107)
CO2 SERPL-SCNC: 23.7 MMOL/L (ref 22–29)
CREAT SERPL-MCNC: 1.02 MG/DL (ref 0.57–1)
D-LACTATE SERPL-SCNC: 1.7 MMOL/L (ref 0.5–2)
DEPRECATED RDW RBC AUTO: 44.6 FL (ref 37–54)
EGFRCR SERPLBLD CKD-EPI 2021: 63.9 ML/MIN/1.73
EOSINOPHIL # BLD AUTO: 0.21 10*3/MM3 (ref 0–0.4)
EOSINOPHIL NFR BLD AUTO: 2.7 % (ref 0.3–6.2)
ERYTHROCYTE [DISTWIDTH] IN BLOOD BY AUTOMATED COUNT: 13.1 % (ref 12.3–15.4)
GLOBULIN UR ELPH-MCNC: 3.3 GM/DL
GLUCOSE SERPL-MCNC: 88 MG/DL (ref 65–99)
HCT VFR BLD AUTO: 43.2 % (ref 34–46.6)
HGB BLD-MCNC: 14.8 G/DL (ref 12–15.9)
IMM GRANULOCYTES # BLD AUTO: 0.03 10*3/MM3 (ref 0–0.05)
IMM GRANULOCYTES NFR BLD AUTO: 0.4 % (ref 0–0.5)
LYMPHOCYTES # BLD AUTO: 2.24 10*3/MM3 (ref 0.7–3.1)
LYMPHOCYTES NFR BLD AUTO: 29.1 % (ref 19.6–45.3)
MCH RBC QN AUTO: 31.8 PG (ref 26.6–33)
MCHC RBC AUTO-ENTMCNC: 34.3 G/DL (ref 31.5–35.7)
MCV RBC AUTO: 92.9 FL (ref 79–97)
MONOCYTES # BLD AUTO: 0.67 10*3/MM3 (ref 0.1–0.9)
MONOCYTES NFR BLD AUTO: 8.7 % (ref 5–12)
NEUTROPHILS NFR BLD AUTO: 4.52 10*3/MM3 (ref 1.7–7)
NEUTROPHILS NFR BLD AUTO: 58.6 % (ref 42.7–76)
PLATELET # BLD AUTO: 301 10*3/MM3 (ref 140–450)
PMV BLD AUTO: 8.9 FL (ref 6–12)
POTASSIUM SERPL-SCNC: 3.7 MMOL/L (ref 3.5–5.2)
PROT SERPL-MCNC: 7.7 G/DL (ref 6–8.5)
RBC # BLD AUTO: 4.65 10*6/MM3 (ref 3.77–5.28)
SODIUM SERPL-SCNC: 137 MMOL/L (ref 136–145)
WBC NRBC COR # BLD AUTO: 7.71 10*3/MM3 (ref 3.4–10.8)

## 2025-03-18 PROCEDURE — 83605 ASSAY OF LACTIC ACID: CPT | Performed by: PHYSICIAN ASSISTANT

## 2025-03-18 PROCEDURE — 87040 BLOOD CULTURE FOR BACTERIA: CPT | Performed by: PHYSICIAN ASSISTANT

## 2025-03-18 PROCEDURE — 85025 COMPLETE CBC W/AUTO DIFF WBC: CPT | Performed by: PHYSICIAN ASSISTANT

## 2025-03-18 PROCEDURE — 99285 EMERGENCY DEPT VISIT HI MDM: CPT

## 2025-03-18 PROCEDURE — 36415 COLL VENOUS BLD VENIPUNCTURE: CPT

## 2025-03-18 PROCEDURE — 80053 COMPREHEN METABOLIC PANEL: CPT | Performed by: PHYSICIAN ASSISTANT

## 2025-03-18 PROCEDURE — 87205 SMEAR GRAM STAIN: CPT | Performed by: PHYSICIAN ASSISTANT

## 2025-03-18 PROCEDURE — 74177 CT ABD & PELVIS W/CONTRAST: CPT

## 2025-03-18 PROCEDURE — 25510000001 IOPAMIDOL PER 1 ML: Performed by: EMERGENCY MEDICINE

## 2025-03-18 PROCEDURE — 87070 CULTURE OTHR SPECIMN AEROBIC: CPT | Performed by: PHYSICIAN ASSISTANT

## 2025-03-18 RX ORDER — NAPROXEN 500 MG/1
500 TABLET ORAL 2 TIMES DAILY PRN
Qty: 20 TABLET | Refills: 0 | Status: SHIPPED | OUTPATIENT
Start: 2025-03-18

## 2025-03-18 RX ORDER — IOPAMIDOL 755 MG/ML
100 INJECTION, SOLUTION INTRAVASCULAR
Status: COMPLETED | OUTPATIENT
Start: 2025-03-18 | End: 2025-03-18

## 2025-03-18 RX ORDER — ACETAMINOPHEN 325 MG/1
650 TABLET ORAL ONCE
Status: COMPLETED | OUTPATIENT
Start: 2025-03-18 | End: 2025-03-18

## 2025-03-18 RX ORDER — CLINDAMYCIN HYDROCHLORIDE 150 MG/1
300 CAPSULE ORAL ONCE
Status: COMPLETED | OUTPATIENT
Start: 2025-03-18 | End: 2025-03-18

## 2025-03-18 RX ORDER — CLINDAMYCIN HYDROCHLORIDE 300 MG/1
300 CAPSULE ORAL 3 TIMES DAILY
Qty: 30 CAPSULE | Refills: 0 | Status: SHIPPED | OUTPATIENT
Start: 2025-03-18 | End: 2025-03-20

## 2025-03-18 RX ORDER — CLINDAMYCIN HYDROCHLORIDE 150 MG/1
150 CAPSULE ORAL ONCE
Status: DISCONTINUED | OUTPATIENT
Start: 2025-03-18 | End: 2025-03-18

## 2025-03-18 RX ADMIN — ACETAMINOPHEN 650 MG: 325 TABLET ORAL at 17:35

## 2025-03-18 RX ADMIN — CLINDAMYCIN HYDROCHLORIDE 300 MG: 150 CAPSULE ORAL at 00:22

## 2025-03-18 RX ADMIN — IOPAMIDOL 85 ML: 755 INJECTION, SOLUTION INTRAVENOUS at 17:04

## 2025-03-18 NOTE — FSED PROVIDER NOTE
Subjective   History of Present Illness    59-year-old male presents emergency department woman presents emergency department with umbilical hernia and some overlying cellulitis with some drainage.  There is no abscess.  The umbilical hernia is mild.  She has no abdominal rigidity.  No fevers vomiting constitutional symptoms.  She called her surgeon and her surgeon has told her to come to the ER for evaluation.  She has been on some antibiotics but they do not seem to have been working up until this point in time.        Review of Systems    All systems negative except as otherwise mentioned in the HPI    Past Medical History:   Diagnosis Date    Allergy to alpha-gal     Anxiety     Arthritis     Fibromyalgia, primary     GERD (gastroesophageal reflux disease)     Hyperlipidemia     Hypertension     Neuropathy        Allergies   Allergen Reactions    Sulfa Antibiotics Other (See Comments)     LIPS SWELLING  Other reaction(s): Other (See Comments)  LIPS SWELLING    Sulfamethoxazole-Trimethoprim Anaphylaxis    Meperidine Nausea And Vomiting    Moxifloxacin Nausea And Vomiting     Emesis        Past Surgical History:   Procedure Laterality Date    APPENDECTOMY      BILATERAL BREAST REDUCTION      CERVICAL FUSION       SECTION      SINUS SURGERY      TUBAL ABDOMINAL LIGATION      WRIST SURGERY      left       Family History   Problem Relation Age of Onset    COPD Mother     Cancer Father         melanoma    Arthritis Father     Cancer Sister         uterine    Alzheimer's disease Maternal Grandmother     Heart disease Maternal Grandfather     Diabetes Paternal Grandmother     Cancer Sister        Social History     Socioeconomic History    Marital status:    Tobacco Use    Smoking status: Never     Passive exposure: Never    Smokeless tobacco: Never   Vaping Use    Vaping status: Never Used   Substance and Sexual Activity    Alcohol use: Yes     Comment: 4-6 drinks weekly    Drug use: Never    Sexual  activity: Yes     Partners: Male     Birth control/protection: I.U.D.           Objective   Physical Exam    General: Alert and oriented, conversant  Eye: PERRL, EOMI, nomal conjunctiva  HENT: Normocephalic, normal hearing, moist oral mucosa    Lungs: Nonlabored respiration, no wheezing  Heart: Normal Rate, no mumurs gallops or rubs  Abdomen: Soft, Non tender, no peritoneal signs    Musculoskeletal: Normal range of motion and strength, no tenderness or swelling  Skin: Warm and dry, no alarming rashes  Neurologic: Awake, responsive, moving all extremities, no focal deficits  Psychiatric:  Cooperative, appropriate mood and affect    Procedures           ED Course                                           Medical Decision Making  Problems Addressed:  Abdominal wall cellulitis: complicated acute illness or injury  Umbilical hernia without obstruction and without gangrene: complicated acute illness or injury    Risk  Prescription drug management.    59-year-old woman presents emergency department umbilical hernia and some surrounding cellulitis.  Is not incarcerated or strangulated.  There is some surrounding cellulitis.  Abdomen is nonrigid no rebound rigidity or guarding.    She is already on doxycycline but working to give her clindamycin as well.    I gave my usual guidance regarding the possible need for admission if these 2 antibiotics do not get this taken care of given the sensitive location where it is and the chance for intra-abdominal spread.    The patient would like to try a trial of antibiotics at home with the knowledge and understanding that she if this does not work she may need to come in for further workup inpatient    Final diagnoses:   Umbilical hernia without obstruction and without gangrene   Abdominal wall cellulitis       ED Disposition  ED Disposition       ED Disposition   Discharge    Condition   Stable    Comment   --               Jes Juan, APRN  [address]  [address]  [place] [address] [phone]    In 2 days  If symptoms worsen         Medication List        New Prescriptions      clindamycin 300 MG capsule  Commonly known as: CLEOCIN  Take 1 capsule by mouth 3 (Three) Times a Day for 10 days.     naproxen 500 MG tablet  Commonly known as: NAPROSYN  Take 1 tablet by mouth 2 (Two) Times a Day As Needed for Mild Pain or Moderate Pain.               Where to Get Your Medications        These medications were sent to [facility]/pharmacy #[id number] - [place], [place] - [address] [facility] [phone] [place] [phone]   [address] [facility] [place] [address]      Phone: [phone]   clindamycin 300 MG capsule  naproxen 500 MG tablet

## 2025-03-18 NOTE — FS[facility] PROVIDER NOTE
EMERGENCY DEPARTMENT ENCOUNTER    Room Number:  06/06  Date seen:  3/18/2025  Time seen: 19:01 EDT  PCP: Jes Juan APRN  Historian: Patient    Discussed/obtained information from independent historians: N/A    HPI:  Chief complaint:.  Buccal pain/abscess  A complete HPI/ROS/PMH/PSH/SH/FH are unobtainable due to: Nothing  Context:Asya Lambert is a 59 y.o. female who presents to the ED with c/o periumbilical pain and abscess.  Patient reports this is her third visit for the same.  She states she was initially seen at the immediate care/urgent care center in [place], [place] and placed on Keflex 1 week ago.  Since that time the area is slowly grown in size.  She reports she had a CT scan at this visit and is unaware of the specific results.  She was seen again yesterday evening at the same facility and her antibiotics were changed.  She states she almost feels as if it has grown in size since yesterday and just generally is feeling well.  She is concerned not feeling well may be secondary to the abscess and cellulitis.  She is here for further evaluation.    External (non-ED) record review: Patient's antibiotic was switched last night to clindamycin 300 mg 3 times a day for 10 days.  She had been on doxycycline prior.  CT abdomen pelvis 3/14/2024 showed small rim-enhancing fluid collection near the umbilicus which may represent small abscess or inflammation of urachal sinus.        Chronic or social conditions impacting care:    ALLERGIES  Sulfa antibiotics, Sulfamethoxazole-trimethoprim, Meperidine, and Moxifloxacin    PAST MEDICAL HISTORY  Active Ambulatory Problems     Diagnosis Date Noted    Anxiety 01/11/2022    Closed fracture of middle or proximal phalanx or phalanges of hand 11/05/2017    Deviated septum 03/02/2015    Gastroesophageal reflux disease 04/24/2012    History of sinus surgery 03/02/2015    Hyperlipidemia 01/15/2013     Resolved Ambulatory Problems     Diagnosis Date Noted     No Resolved Ambulatory Problems     Past Medical History:   Diagnosis Date    Allergy to alpha-gal     Arthritis     Fibromyalgia, primary     GERD (gastroesophageal reflux disease)     Hypertension     Neuropathy        PAST SURGICAL HISTORY  Past Surgical History:   Procedure Laterality Date    APPENDECTOMY      BILATERAL BREAST REDUCTION      CERVICAL FUSION       SECTION      SINUS SURGERY      TUBAL ABDOMINAL LIGATION      WRIST SURGERY      left       FAMILY HISTORY  Family History   Problem Relation Age of Onset    COPD Mother     Cancer Father         melanoma    Arthritis Father     Cancer Sister         uterine    Alzheimer's disease Maternal Grandmother     Heart disease Maternal Grandfather     Diabetes Paternal Grandmother     Cancer Sister        SOCIAL HISTORY  Social History     Socioeconomic History    Marital status:    Tobacco Use    Smoking status: Never     Passive exposure: Never    Smokeless tobacco: Never   Vaping Use    Vaping status: Never Used   Substance and Sexual Activity    Alcohol use: Yes     Comment: 4-6 drinks weekly    Drug use: Never    Sexual activity: Yes     Partners: Male     Birth control/protection: I.U.D.       REVIEW OF SYSTEMS  Review of Systems    All systems reviewed and negative except for those discussed in HPI.     PHYSICAL EXAM    I have reviewed the triage vital signs and nursing notes.  Vitals:    25 1530   BP: 151/99   Pulse:    Resp:    Temp:    SpO2:      Physical Exam    GENERAL: DW female, not distressed  HENT: nares patent  EYES: no scleral icterus  NECK: no ROM limitations  CV: regular rhythm, regular rate  RESPIRATORY: normal effort  ABDOMEN: soft  : deferred  MUSCULOSKELETAL: no deformity  NEURO: alert, moves all extremities, follows commands  SKIN: Roughly 1.5 x 1.5 cm area of erythema and induration.  To the right/midline of the umbilicus.    LAB RESULTS  Recent Results (from the past 24 hours)   Comprehensive Metabolic Panel     Collection Time: 03/18/25  4:05 PM    Specimen: Blood   Result Value Ref Range    Glucose 88 65 - 99 mg/dL    BUN 14 6 - 20 mg/dL    Creatinine 1.02 (H) 0.57 - 1.00 mg/dL    Sodium 137 136 - 145 mmol/L    Potassium 3.7 3.5 - 5.2 mmol/L    Chloride 99 98 - 107 mmol/L    CO2 23.7 22.0 - 29.0 mmol/L    Calcium 9.6 8.6 - 10.5 mg/dL    Total Protein 7.7 6.0 - 8.5 g/dL    Albumin 4.4 3.5 - 5.2 g/dL    ALT (SGPT) 18 1 - 33 U/L    AST (SGOT) 19 1 - 32 U/L    Alkaline Phosphatase 98 39 - 117 U/L    Total Bilirubin 0.5 0.0 - 1.2 mg/dL    Globulin 3.3 gm/dL    A/G Ratio 1.3 g/dL    BUN/Creatinine Ratio 13.7 7.0 - 25.0    Anion Gap 14.3 5.0 - 15.0 mmol/L    eGFR 63.9 >60.0 mL/min/1.73   Lactic Acid, Plasma    Collection Time: 03/18/25  4:05 PM    Specimen: Blood   Result Value Ref Range    Lactate 1.7 0.5 - 2.0 mmol/L   CBC Auto Differential    Collection Time: 03/18/25  4:05 PM    Specimen: Blood   Result Value Ref Range    WBC 7.71 3.40 - 10.80 10*3/mm3    RBC 4.65 3.77 - 5.28 10*6/mm3    Hemoglobin 14.8 12.0 - 15.9 g/dL    Hematocrit 43.2 34.0 - 46.6 %    MCV 92.9 79.0 - 97.0 fL    MCH 31.8 26.6 - 33.0 pg    MCHC 34.3 31.5 - 35.7 g/dL    RDW 13.1 12.3 - 15.4 %    RDW-SD 44.6 37.0 - 54.0 fl    MPV 8.9 6.0 - 12.0 fL    Platelets 301 140 - 450 10*3/mm3    Neutrophil % 58.6 42.7 - 76.0 %    Lymphocyte % 29.1 19.6 - 45.3 %    Monocyte % 8.7 5.0 - 12.0 %    Eosinophil % 2.7 0.3 - 6.2 %    Basophil % 0.5 0.0 - 1.5 %    Immature Grans % 0.4 0.0 - 0.5 %    Neutrophils, Absolute 4.52 1.70 - 7.00 10*3/mm3    Lymphocytes, Absolute 2.24 0.70 - 3.10 10*3/mm3    Monocytes, Absolute 0.67 0.10 - 0.90 10*3/mm3    Eosinophils, Absolute 0.21 0.00 - 0.40 10*3/mm3    Basophils, Absolute 0.04 0.00 - 0.20 10*3/mm3    Immature Grans, Absolute 0.03 0.00 - 0.05 10*3/mm3       Ordered the above labs and independently interpreted results.  My findings will be discussed in the ED course or medical decision making section below    RADIOLOGY RESULTS  CT  Abdomen Pelvis With Contrast  Result Date: 3/18/2025  CT ABDOMEN PELVIS W CONTRAST-  INDICATION: Evaluate for worsening periumbilical abscess  COMPARISON: CT abdomen pelvis March 14, 2025  TECHNIQUE: Routine CT abdomen/pelvis with IV contrast. Coronal and sagittal reformats. Radiation dose reduction techniques were utilized, including automated exposure control and exposure modulation based on body size.  FINDINGS:  Lung bases: Small amount of subsegmental atelectasis seen at the bases.  ABDOMEN: No liver mass. Gallbladder appears partially contracted. No biliary ductal dilatation. Spleen is normal in size. Incidental splenule. No pancreatic mass or pancreatic ductal dilatation seen. Mild pancreatic lipomatosis. No adrenal nodules. No renal mass or hydronephrosis.  Pelvis: Underdistended bladder. No bladder calculus. Prominent median umbilical ligament, seen to the umbilicus. Anteverted uterus. Small calcified mass in the right uterus, series 2, axial mage 105, measuring 1.1 cm, suspect a leiomyoma. No adnexal mass.  Bowel: No small bowel obstruction. Suspect appendectomy.  Abdominal wall: Rectus diastases. Small right periumbilical abdominal wall collection, series 2, axial mage 79, measuring 1.0 x 0.5 x 1.0 cm, series 4, sagittal mage 82, with a thin enhancing rim, previously measuring 0.7 x 0.7 x 0.6 cm, extends to just below the skin surface and deep to the rectus sheath, adjacent to the end of a median umbilical ligament, with small amount of adjacent fat stranding.  Retroperitoneum: No lymphadenopathy.  Vasculature: Patent. No abdominal aortic aneurysm.  Osseous structures: Lower lumbar facet degenerative arthropathy, with grade 1 anterolisthesis of L5 on S1. Mild retrolisthesis of L1 on L2. 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       Small right periumbilical rim-enhancing fluid collection, extends to the end of a median umbilical ligament, appearing slightly  larger in the interval. Could be a small periumbilical abscess or phlegmon or inflammation or infection of a urachal sinus.  This report was finalized on 3/18/2025 5:35 PM by Dr. Dalton Siegel M.D on Workstation: [id number]         Ordered the above noted radiological studies.  Independently interpreted by me.  My findings will be discussed in the medical decision section below.     PROGRESS, DATA ANALYSIS, CONSULTS AND MEDICAL DECISION MAKING    Please note that this section constitutes my independent interpretation of clinical data including lab results, radiology, EKG's.  This constitutes my independent professional opinion regarding differential diagnosis and management of this patient.  It may include any factors such as history from outside sources, review of external records, social determinants of health, management of medications, response to those treatments, and discussions with other providers.    ED Course as of 03/18/25 1916   Tue Mar 18, 2025   1907 WBC: 7.71 [RC]   1907 Lactate: 1.7 [RC]   1907 BUN: 14 [RC]   1907 Glucose: 88 [RC]   1907 Sodium: 137 [RC]   1907 Potassium: 3.7 [RC]   1907 CO2: 23.7 [RC]   1907 Anion Gap: 14.3 [RC]   1908 Discussed CT findings and patient case with general surgery Dr. Dominguez.  Will go ahead and place the neck and the lesion and see if I can get training.  He will see the patient in follow-up in 2 days.  Will have her to continue with her antibiotics for the interim. [RC]   1909 I&D performed wound irrigated and cleaned there was some very mild purulent drainage and a significant amount of caseous material consistent with sebaceous cyst.  Patient tolerated procedure well.  Will have her to continue her previous antibiotics as discussed with general surgery and they will see in 2 days.  Will DC at this time.  She knows to return with fever or worsening symptoms or should she have any further concerns. [RC]      ED Course User Index  [RC] Sivakumar Tobar  III, PA     Orders placed during this visit:  Orders Placed This Encounter   Procedures    Incision & Drainage    Blood Culture - Blood,    Blood Culture - Blood,    Wound Culture - Wound, Abdominal Wall    CT Abdomen Pelvis With Contrast    Comprehensive Metabolic Panel    Lactic Acid, Plasma    CBC Auto Differential    CBC & Differential    ED Acknowledgement Form Needed;     Incision & Drainage    Date/Time: 3/18/2025 7:13 PM    Performed by: Sivakumar Tobar III, PA  Authorized by: Chema Sweeney MD    Consent:     Consent obtained:  Verbal    Consent given by:  Patient  Universal protocol:     Patient identity confirmed:  Verbally with patient and arm band  Location:     Type:  Abscess    Size:  1.5    Location:  Trunk    Trunk location:  Abdomen  Pre-procedure details:     Skin preparation:  Povidone-iodine  Sedation:     Sedation type:  None  Anesthesia:     Anesthesia method:  Local infiltration    Local anesthetic:  Lidocaine 1% WITH epi (1 cc)  Procedure type:     Complexity:  Simple  Procedure details:     Incision types:  Single straight    Incision depth:  Dermal    Drainage:  Purulent (Purulent and caseous drainage)    Packing materials:  None  Post-procedure details:     Procedure completion:  Tolerated well, no immediate complications          Medical Decision Making  Amount and/or Complexity of Data Reviewed  Labs: ordered.  Radiology: ordered.    Risk  OTC drugs.  Prescription drug management.    DDx: Abscess, hernia, fistula, phlegmon.  Patient reports this is getting worse.  Will obtain a CT scan with contrast to further evaluate.    CT scan reveals increase size of the rim-enhancing fluid collection.  It is gone from 0.7 cm x 0.7 cm to 1 cm x 1 cm.  Will place call to general surgery to discuss further management.  See ED course for MDM process.    DIAGNOSIS  Final diagnoses:   Cellulitis of periumbilical region   Infected sebaceous cyst of skin-periumbilical          Medication List       No changes were made to your prescriptions during this visit.         FOLLOW-UP  Blaise Dominguez MD  [address] [place] [id number] [phone]    In 2 days  For wound re-check        Latest Documented Vital Signs:  As of 19:16 EDT  BP- 151/99 HR- 61 Temp- 98.5 °F (36.9 °C) (Oral) O2 sat- 99%    Appropriate PPE utilized throughout this patient encounter to include mask, if indicated, per current protocol. Hand hygiene was performed before donning PPE and after removal when leaving the room.    Please note that portions of this were completed with a voice recognition program.     Note Disclaimer: At [facility], we believe that sharing information builds trust and better relationships. You are receiving this note because you are receiving care at [facility] or recently visited. It is possible you will see health information before a provider has talked with you about it. This kind of information can be easy to misunderstand. To help you fully understand what it means for your health, we urge you to discuss this note with your provider.

## 2025-03-18 NOTE — DISCHARGE INSTRUCTIONS
Continue with your current antibiotic.  Keep the area clean and dry with mild soap and water.  Change the dressing once or twice a day.  Follow-up with with the general surgeon above.  Call the number above tomorrow and tell them you were to be seen in follow-up on Thursday.  Return to the ER with any fever, worsening symptoms, or should you have any further concerns.

## 2025-03-20 ENCOUNTER — OFFICE VISIT (OUTPATIENT)
Dept: SURGERY | Facility: CLINIC | Age: 60
End: 2025-03-20
Payer: COMMERCIAL

## 2025-03-20 VITALS
SYSTOLIC BLOOD PRESSURE: 122 MMHG | HEART RATE: 79 BPM | OXYGEN SATURATION: 99 % | WEIGHT: 199.8 LBS | BODY MASS INDEX: 31.36 KG/M2 | HEIGHT: 67 IN | DIASTOLIC BLOOD PRESSURE: 82 MMHG

## 2025-03-20 DIAGNOSIS — R10.33 PERIUMBILICAL ABDOMINAL PAIN: Primary | ICD-10-CM

## 2025-03-20 RX ORDER — OMEPRAZOLE 40 MG/1
40 CAPSULE, DELAYED RELEASE ORAL DAILY
Qty: 90 CAPSULE | Refills: 0 | Status: SHIPPED | OUTPATIENT
Start: 2025-03-20

## 2025-03-20 NOTE — PROGRESS NOTES
Colorectal & General Surgery  Consultation    Patient: Asya Lambert  YOB: 1965  MRN: [id number]      Assessment  Asya Lambert is a 59 y.o. female with periumbilical abscess.  I suspect that this is likely an epidermal inclusion cyst that became infected.  It has been adequately drained and there is no evidence of cellulitis at this time.  I recommend that she complete her course of doxycycline.  If this was to recur, I would recommend excision of the entire cyst/abscess capsule.  This could likely be done in the office.  Discussed return precautions.    She is welcome to follow-up with me anytime.    Referring Provider: Sivakumar RUSHING    Reason for Consultation: Periumbilical abscess    History of Present Illness   Asya Lambert is a 59 y.o. female who presents with a periumbilical abscess.  She says it began about a week ago.  She has been seen at 2 different emergency room's, the first of which gave her doxycycline and second which performed incision and drainage and continued her doxycycline.  This is never happened before.  No trauma to that area.    Past Medical History   Past Medical History:   Diagnosis Date    Allergy to alpha-gal     Anxiety     Arthritis     Fibromyalgia, primary     GERD (gastroesophageal reflux disease)     Hyperlipidemia     Hypertension     Neuropathy         Past Surgical History   Past Surgical History:   Procedure Laterality Date    APPENDECTOMY      BILATERAL BREAST REDUCTION      CERVICAL FUSION       SECTION      SINUS SURGERY      TUBAL ABDOMINAL LIGATION      WRIST SURGERY      left       Social History  Social History     Socioeconomic History    Marital status:    Tobacco Use    Smoking status: Never     Passive exposure: Never    Smokeless tobacco: Never   Vaping Use    Vaping status: Never Used   Substance and Sexual Activity    Alcohol use: Yes     Comment: 4-6 drinks weekly    Drug use: Never    Sexual activity: Yes     Partners: Male      Birth control/protection: I.U.D.       Family History  Family History   Problem Relation Age of Onset    COPD Mother     Cancer Father         melanoma    Arthritis Father     Cancer Sister         uterine    Alzheimer's disease Maternal Grandmother     Heart disease Maternal Grandfather     Diabetes Paternal Grandmother     Cancer Sister      Review of Systems  Negative except as documented in the HPI.     Allergies  Allergies   Allergen Reactions    Sulfa Antibiotics Other (See Comments)     LIPS SWELLING  Other reaction(s): Other (See Comments)  LIPS SWELLING    Sulfamethoxazole-Trimethoprim Anaphylaxis    Meperidine Nausea And Vomiting    Moxifloxacin Nausea And Vomiting     Emesis        Medications    Current Outpatient Medications:     ALPRAZolam (XANAX) 0.5 MG tablet, TAKE 1/2 TO 1 TABLET BY MOUTH DAILY AS NEEDED FOR ANXIETY, Disp: , Rfl:     buPROPion XL (WELLBUTRIN XL) 150 MG 24 hr tablet, Take 1 tablet by mouth Daily., Disp: 90 tablet, Rfl: 0    busPIRone (BUSPAR) 10 MG tablet, Take 1 tablet by mouth 2 (Two) Times a Day., Disp: 180 tablet, Rfl: 0    cetirizine (zyrTEC) 5 MG tablet, Take 1 tablet by mouth Daily., Disp: , Rfl:     Cholecalciferol 50 MCG (2000 UT) capsule, , Disp: , Rfl:     desvenlafaxine (PRISTIQ) 50 MG 24 hr tablet, Take 1 tablet by mouth Daily., Disp: 90 tablet, Rfl: 0    doxycycline (MONODOX) 100 MG capsule, Take 1 capsule by mouth 2 (Two) Times a Day for 10 days., Disp: 20 capsule, Rfl: 0    estradiol (ESTRACE) 1 MG tablet, Take 1 tablet by mouth Daily., Disp: 90 tablet, Rfl: 1    ezetimibe (ZETIA) 10 MG tablet, Take 1 tablet by mouth Daily., Disp: 90 tablet, Rfl: 0    fluticasone (FLONASE) 50 MCG/ACT nasal spray, , Disp: , Rfl:     gabapentin (NEURONTIN) 100 MG capsule, TAKE 1 CAPSULE BY MOUTH THREE TIMES DAILY, Disp: 270 capsule, Rfl: 0    gabapentin (NEURONTIN) 300 MG capsule, Take 1 capsule by mouth 3 (Three) Times a Day., Disp: 90 capsule, Rfl: 0    hydroCHLOROthiazide 25 MG  tablet, Take 1 tablet by mouth Daily., Disp: 90 tablet, Rfl: 0    metoprolol succinate XL (TOPROL-XL) 25 MG 24 hr tablet, Take 1 tablet by mouth Daily., Disp: 90 tablet, Rfl: 0    naproxen (NAPROSYN) 500 MG tablet, Take 1 tablet by mouth 2 (Two) Times a Day As Needed for Mild Pain or Moderate Pain., Disp: 20 tablet, Rfl: 0    omeprazole (priLOSEC) 40 MG capsule, TAKE 1 CAPSULE BY MOUTH DAILY, Disp: 90 capsule, Rfl: 0    Progesterone (PROMETRIUM) 200 MG capsule, Take 1 capsule by mouth Daily., Disp: , Rfl:     Vital Signs  Vitals:    03/20/25 0915   BP: 122/82   Pulse: 79   SpO2: 99%        Physical Exam  Constitutional: Resting comfortably, no acute distress  Neck: Supple, trachea midline  Respiratory: No increased work of breathing, Symmetric excursion  Cardiovascular: Well pefursed, no jugular venous distention evident   Abdominal: Periumbilical abscess is healing well.  No significant cellulitis.  No drainage soft, non-tender, non-distended  Lymphatics: No cervical or suprascapular adenopathy  Skin: Warm, dry, no rash on visualized skin surfaces  Musculoskeletal: Symmetric strength, no obvious gross abnormalities  Psychiatric: Alert and oriented ×3, normal affect          I personally reviewed CT scan of the abdomen pelvis that demonstrated periumbilical abscess.    Gama Dominguez MD  Colorectal & General Surgery  [facility]    [address], [address]  [place], [place], [address]  P: [phone]  F: [phone]

## 2025-03-22 LAB
BACTERIA SPEC AEROBE CULT: NORMAL
GRAM STN SPEC: NORMAL
GRAM STN SPEC: NORMAL

## 2025-03-23 LAB
BACTERIA SPEC AEROBE CULT: NORMAL
BACTERIA SPEC AEROBE CULT: NORMAL

## 2025-05-20 DIAGNOSIS — F41.9 ANXIETY: ICD-10-CM

## 2025-05-20 DIAGNOSIS — R00.2 PALPITATIONS: ICD-10-CM

## 2025-05-20 RX ORDER — DESVENLAFAXINE 50 MG/1
50 TABLET, FILM COATED, EXTENDED RELEASE ORAL DAILY
Qty: 90 TABLET | Refills: 0 | Status: SHIPPED | OUTPATIENT
Start: 2025-05-20

## 2025-05-20 RX ORDER — BUSPIRONE HYDROCHLORIDE 10 MG/1
10 TABLET ORAL 2 TIMES DAILY
Qty: 180 TABLET | Refills: 0 | Status: SHIPPED | OUTPATIENT
Start: 2025-05-20

## 2025-05-20 RX ORDER — METOPROLOL SUCCINATE 25 MG/1
25 TABLET, EXTENDED RELEASE ORAL DAILY
Qty: 90 TABLET | Refills: 0 | Status: SHIPPED | OUTPATIENT
Start: 2025-05-20

## 2025-05-26 DIAGNOSIS — E78.5 HYPERLIPIDEMIA, UNSPECIFIED HYPERLIPIDEMIA TYPE: ICD-10-CM

## 2025-05-26 DIAGNOSIS — I10 HYPERTENSION, UNSPECIFIED TYPE: ICD-10-CM

## 2025-05-26 DIAGNOSIS — R41.840 ATTENTION DEFICIT: ICD-10-CM

## 2025-05-26 RX ORDER — EZETIMIBE 10 MG/1
10 TABLET ORAL DAILY
Qty: 90 TABLET | Refills: 0 | Status: SHIPPED | OUTPATIENT
Start: 2025-05-26

## 2025-05-26 RX ORDER — BUPROPION HYDROCHLORIDE 150 MG/1
150 TABLET ORAL DAILY
Qty: 90 TABLET | Refills: 0 | Status: SHIPPED | OUTPATIENT
Start: 2025-05-26

## 2025-05-26 RX ORDER — HYDROCHLOROTHIAZIDE 25 MG/1
25 TABLET ORAL DAILY
Qty: 90 TABLET | Refills: 0 | Status: SHIPPED | OUTPATIENT
Start: 2025-05-26

## 2025-06-18 RX ORDER — OMEPRAZOLE 40 MG/1
40 CAPSULE, DELAYED RELEASE ORAL DAILY
Qty: 90 CAPSULE | Refills: 0 | Status: SHIPPED | OUTPATIENT
Start: 2025-06-18

## 2025-06-30 NOTE — PROGRESS NOTES
Subjective   Asya Lambert is a 59 y.o. female.       HPI   Pt is here today with concern of a sore throat.   Symptoms started 2 weeks ago.   She took 10 days of Keflex that she had at home.    She reports continued sore throat; teeth are achy; she has sinus pressure.  She says it hurts to swallow.  Denies any nasal drainage; ears felt a little full last week.  No fevers.    She takes Zyrtec daily.      The following portions of the patient's history were reviewed and updated as appropriate: allergies, current medications, past family history, past medical history, past social history, past surgical history, and problem list.    Review of Systems   Constitutional:  Negative for chills, fatigue and fever.   HENT:  Positive for congestion, postnasal drip, rhinorrhea, sinus pressure, sore throat and swollen glands. Negative for ear discharge, ear pain, sneezing and trouble swallowing.    Eyes:  Negative for pain, discharge, redness and itching.   Respiratory:  Negative for cough, shortness of breath and wheezing.    Cardiovascular:  Negative for chest pain and palpitations.   Gastrointestinal:  Negative for diarrhea, nausea and vomiting.   Neurological:  Negative for dizziness, weakness, light-headedness and headache.   Psychiatric/Behavioral:  Negative for depressed mood. The patient is not nervous/anxious.        Objective   Physical Exam  Vitals reviewed.   Constitutional:       General: She is not in acute distress.     Appearance: Normal appearance.   HENT:      Head: Normocephalic and atraumatic.      Right Ear: Hearing, tympanic membrane, ear canal and external ear normal.      Left Ear: Hearing, tympanic membrane, ear canal and external ear normal.      Nose: Rhinorrhea present.      Right Sinus: Maxillary sinus tenderness and frontal sinus tenderness present.      Left Sinus: Maxillary sinus tenderness and frontal sinus tenderness present.      Mouth/Throat:      Lips: Pink.      Mouth: Mucous membranes are  moist.      Pharynx: Posterior oropharyngeal erythema and postnasal drip present. No pharyngeal swelling, oropharyngeal exudate or uvula swelling.   Eyes:      General:         Right eye: No discharge.         Left eye: No discharge.      Conjunctiva/sclera: Conjunctivae normal.   Cardiovascular:      Rate and Rhythm: Normal rate and regular rhythm.      Pulses: Normal pulses.      Heart sounds: Normal heart sounds. No murmur heard.  Pulmonary:      Effort: Pulmonary effort is normal. No respiratory distress.      Breath sounds: Normal breath sounds. No wheezing, rhonchi or rales.   Chest:      Chest wall: No tenderness.   Musculoskeletal:      Cervical back: Normal range of motion and neck supple.   Lymphadenopathy:      Cervical: No cervical adenopathy.   Skin:     General: Skin is warm and dry.   Neurological:      General: No focal deficit present.      Mental Status: She is alert and oriented to person, place, and time.   Psychiatric:         Mood and Affect: Mood normal.                Procedures   Assessment & Plan   Diagnoses and all orders for this visit:    1. Acute maxillary sinusitis, recurrence not specified (Primary)  Comments:  Given Augmentin.   Cont. Flonase and Zyrtec.   Warm salt water gargles several times daily.   Tylenol or ibuprofen PRN.   Call for worsening.  Orders:  -     amoxicillin-clavulanate (AUGMENTIN) 875-125 MG per tablet; Take 1 tablet by mouth 2 (Two) Times a Day for 7 days.  Dispense: 14 tablet; Refill: 0

## 2025-07-01 ENCOUNTER — OFFICE VISIT (OUTPATIENT)
Dept: FAMILY MEDICINE CLINIC | Facility: CLINIC | Age: 60
End: 2025-07-01
Payer: COMMERCIAL

## 2025-07-01 VITALS
HEART RATE: 69 BPM | WEIGHT: 200 LBS | DIASTOLIC BLOOD PRESSURE: 93 MMHG | HEIGHT: 67 IN | SYSTOLIC BLOOD PRESSURE: 132 MMHG | BODY MASS INDEX: 31.39 KG/M2 | TEMPERATURE: 97.5 F | OXYGEN SATURATION: 96 %

## 2025-07-01 DIAGNOSIS — J01.00 ACUTE MAXILLARY SINUSITIS, RECURRENCE NOT SPECIFIED: Primary | ICD-10-CM

## 2025-07-01 PROCEDURE — 99213 OFFICE O/P EST LOW 20 MIN: CPT | Performed by: NURSE PRACTITIONER

## 2025-07-01 RX ORDER — EPINEPHRINE 0.3 MG/.3ML
INJECTION SUBCUTANEOUS
COMMUNITY
Start: 2025-04-16

## 2025-07-02 ENCOUNTER — PATIENT ROUNDING (BHMG ONLY) (OUTPATIENT)
Dept: FAMILY MEDICINE CLINIC | Facility: CLINIC | Age: 60
End: 2025-07-02
Payer: COMMERCIAL

## 2025-07-18 DIAGNOSIS — R10.2 PELVIC PAIN: ICD-10-CM

## 2025-07-19 RX ORDER — GABAPENTIN 300 MG/1
300 CAPSULE ORAL 3 TIMES DAILY
Qty: 90 CAPSULE | Refills: 0 | Status: SHIPPED | OUTPATIENT
Start: 2025-07-19

## 2025-08-14 RX ORDER — ESTRADIOL 1 MG/1
1 TABLET ORAL DAILY
Qty: 90 TABLET | Refills: 1 | Status: SHIPPED | OUTPATIENT
Start: 2025-08-14

## 2025-08-15 DIAGNOSIS — F41.9 ANXIETY: ICD-10-CM

## 2025-08-15 DIAGNOSIS — R00.2 PALPITATIONS: ICD-10-CM

## 2025-08-15 RX ORDER — METOPROLOL SUCCINATE 25 MG/1
25 TABLET, EXTENDED RELEASE ORAL DAILY
Qty: 90 TABLET | Refills: 0 | Status: SHIPPED | OUTPATIENT
Start: 2025-08-15

## 2025-08-15 RX ORDER — BUSPIRONE HYDROCHLORIDE 10 MG/1
10 TABLET ORAL 2 TIMES DAILY
Qty: 180 TABLET | Refills: 0 | Status: SHIPPED | OUTPATIENT
Start: 2025-08-15

## 2025-08-15 RX ORDER — DESVENLAFAXINE 50 MG/1
50 TABLET, FILM COATED, EXTENDED RELEASE ORAL DAILY
Qty: 90 TABLET | Refills: 0 | Status: SHIPPED | OUTPATIENT
Start: 2025-08-15

## 2025-08-16 DIAGNOSIS — R10.2 PELVIC PAIN: ICD-10-CM

## 2025-08-18 RX ORDER — GABAPENTIN 300 MG/1
300 CAPSULE ORAL 3 TIMES DAILY
Qty: 90 CAPSULE | Refills: 5 | Status: SHIPPED | OUTPATIENT
Start: 2025-08-18

## 2025-08-25 DIAGNOSIS — I10 HYPERTENSION, UNSPECIFIED TYPE: ICD-10-CM

## 2025-08-25 DIAGNOSIS — R41.840 ATTENTION DEFICIT: ICD-10-CM

## 2025-08-25 DIAGNOSIS — E78.5 HYPERLIPIDEMIA, UNSPECIFIED HYPERLIPIDEMIA TYPE: ICD-10-CM

## 2025-08-25 RX ORDER — BUPROPION HYDROCHLORIDE 150 MG/1
150 TABLET ORAL DAILY
Qty: 90 TABLET | Refills: 3 | Status: SHIPPED | OUTPATIENT
Start: 2025-08-25

## 2025-08-25 RX ORDER — HYDROCHLOROTHIAZIDE 25 MG/1
25 TABLET ORAL DAILY
Qty: 90 TABLET | Refills: 3 | Status: SHIPPED | OUTPATIENT
Start: 2025-08-25

## 2025-08-25 RX ORDER — EZETIMIBE 10 MG/1
10 TABLET ORAL DAILY
Qty: 90 TABLET | Refills: 3 | Status: SHIPPED | OUTPATIENT
Start: 2025-08-25